# Patient Record
Sex: FEMALE | Race: BLACK OR AFRICAN AMERICAN | NOT HISPANIC OR LATINO | Employment: OTHER | URBAN - METROPOLITAN AREA
[De-identification: names, ages, dates, MRNs, and addresses within clinical notes are randomized per-mention and may not be internally consistent; named-entity substitution may affect disease eponyms.]

---

## 2022-09-18 ENCOUNTER — HOSPITAL ENCOUNTER (INPATIENT)
Facility: HOSPITAL | Age: 87
LOS: 5 days | Discharge: HOSPICE | DRG: 871 | End: 2022-09-23
Attending: STUDENT IN AN ORGANIZED HEALTH CARE EDUCATION/TRAINING PROGRAM | Admitting: INTERNAL MEDICINE
Payer: MEDICARE

## 2022-09-18 DIAGNOSIS — A41.9 SEPSIS, DUE TO UNSPECIFIED ORGANISM, UNSPECIFIED WHETHER ACUTE ORGAN DYSFUNCTION PRESENT: ICD-10-CM

## 2022-09-18 DIAGNOSIS — I48.91 ATRIAL FIBRILLATION: ICD-10-CM

## 2022-09-18 DIAGNOSIS — A41.9 SEPSIS: ICD-10-CM

## 2022-09-18 DIAGNOSIS — I47.20 V-TACH: ICD-10-CM

## 2022-09-18 DIAGNOSIS — S09.8XXA BLUNT HEAD TRAUMA: ICD-10-CM

## 2022-09-18 DIAGNOSIS — J18.9 PNEUMONIA OF LEFT LOWER LOBE DUE TO INFECTIOUS ORGANISM: Primary | ICD-10-CM

## 2022-09-18 DIAGNOSIS — R07.9 CHEST PAIN: ICD-10-CM

## 2022-09-18 DIAGNOSIS — R09.02 HYPOXIA: ICD-10-CM

## 2022-09-18 LAB
ABS NEUT (OLG): 3.55 X10(3)/MCL (ref 2.1–9.2)
ALBUMIN SERPL-MCNC: 2.8 GM/DL (ref 3.4–4.8)
ALBUMIN/GLOB SERPL: 1.1 RATIO (ref 1.1–2)
ALP SERPL-CCNC: 52 UNIT/L (ref 40–150)
ALT SERPL-CCNC: 12 UNIT/L (ref 0–55)
APTT PPP: 32.2 SECONDS (ref 23.2–33.7)
AST SERPL-CCNC: 24 UNIT/L (ref 5–34)
BILIRUBIN DIRECT+TOT PNL SERPL-MCNC: 0.7 MG/DL
BUN SERPL-MCNC: 26.3 MG/DL (ref 9.8–20.1)
CALCIUM SERPL-MCNC: 9 MG/DL (ref 8.4–10.2)
CHLORIDE SERPL-SCNC: 108 MMOL/L (ref 98–107)
CO2 SERPL-SCNC: 18 MMOL/L (ref 23–31)
CREAT SERPL-MCNC: 1.78 MG/DL (ref 0.55–1.02)
ERYTHROCYTE [DISTWIDTH] IN BLOOD BY AUTOMATED COUNT: 13.7 % (ref 11.5–17)
ETHANOL SERPL-MCNC: <10 MG/DL
FLUAV AG UPPER RESP QL IA.RAPID: NOT DETECTED
FLUAV AG UPPER RESP QL IA.RAPID: NOT DETECTED
FLUBV AG UPPER RESP QL IA.RAPID: NOT DETECTED
FLUBV AG UPPER RESP QL IA.RAPID: NOT DETECTED
GFR SERPLBLD CREATININE-BSD FMLA CKD-EPI: 28 MLS/MIN/1.73/M2
GLOBULIN SER-MCNC: 2.6 GM/DL (ref 2.4–3.5)
GLUCOSE SERPL-MCNC: 189 MG/DL (ref 70–110)
GLUCOSE SERPL-MCNC: 246 MG/DL (ref 82–115)
HCT VFR BLD AUTO: 40.3 % (ref 37–47)
HGB BLD-MCNC: 11.7 GM/DL (ref 12–16)
IMM GRANULOCYTES # BLD AUTO: 0.02 X10(3)/MCL (ref 0–0.04)
IMM GRANULOCYTES NFR BLD AUTO: 0.4 %
INR BLD: 1.41 (ref 0–1.3)
INSTRUMENT WBC (OLG): 5.3 X10(3)/MCL
LACTATE SERPL-SCNC: 4.2 MMOL/L (ref 0.5–2.2)
LACTATE SERPL-SCNC: 5.4 MMOL/L (ref 0.5–2.2)
LACTATE SERPL-SCNC: 6.9 MMOL/L (ref 0.5–2.2)
LYMPHOCYTES NFR BLD MANUAL: 0.95 X10(3)/MCL
LYMPHOCYTES NFR BLD MANUAL: 18 %
MCH RBC QN AUTO: 29.3 PG (ref 27–31)
MCHC RBC AUTO-ENTMCNC: 29 MG/DL (ref 33–36)
MCV RBC AUTO: 100.8 FL (ref 80–94)
METAMYELOCYTES NFR BLD MANUAL: 10 %
MONOCYTES NFR BLD MANUAL: 0.74 X10(3)/MCL (ref 0.1–1.3)
MONOCYTES NFR BLD MANUAL: 14 %
MYELOCYTES NFR BLD MANUAL: 4 %
NEUTROPHILS NFR BLD MANUAL: 53 %
NRBC BLD AUTO-RTO: 0 %
PLATELET # BLD AUTO: 154 X10(3)/MCL (ref 130–400)
PLATELET # BLD EST: NORMAL 10*3/UL
PMV BLD AUTO: 10.1 FL (ref 7.4–10.4)
POCT GLUCOSE: 189 MG/DL (ref 70–110)
POTASSIUM SERPL-SCNC: 5.4 MMOL/L (ref 3.5–5.1)
PROT SERPL-MCNC: 5.4 GM/DL (ref 5.8–7.6)
PROTHROMBIN TIME: 17 SECONDS (ref 12.5–14.5)
RBC # BLD AUTO: 4 X10(6)/MCL (ref 4.2–5.4)
SARS-COV-2 RNA RESP QL NAA+PROBE: NOT DETECTED
SARS-COV-2 RNA RESP QL NAA+PROBE: NOT DETECTED
SODIUM SERPL-SCNC: 140 MMOL/L (ref 136–145)
WBC # SPEC AUTO: 5.3 X10(3)/MCL (ref 4.5–11.5)

## 2022-09-18 PROCEDURE — 96365 THER/PROPH/DIAG IV INF INIT: CPT

## 2022-09-18 PROCEDURE — 93010 EKG 12-LEAD: ICD-10-PCS | Mod: ,,, | Performed by: INTERNAL MEDICINE

## 2022-09-18 PROCEDURE — 87636 SARSCOV2 & INF A&B AMP PRB: CPT

## 2022-09-18 PROCEDURE — 27000221 HC OXYGEN, UP TO 24 HOURS

## 2022-09-18 PROCEDURE — 93005 ELECTROCARDIOGRAM TRACING: CPT

## 2022-09-18 PROCEDURE — G0390 TRAUMA RESPONS W/HOSP CRITI: HCPCS

## 2022-09-18 PROCEDURE — 93010 ELECTROCARDIOGRAM REPORT: CPT | Mod: ,,, | Performed by: INTERNAL MEDICINE

## 2022-09-18 PROCEDURE — 86901 BLOOD TYPING SEROLOGIC RH(D): CPT | Performed by: SURGERY

## 2022-09-18 PROCEDURE — 96374 THER/PROPH/DIAG INJ IV PUSH: CPT | Mod: 59

## 2022-09-18 PROCEDURE — 83605 ASSAY OF LACTIC ACID: CPT | Performed by: INTERNAL MEDICINE

## 2022-09-18 PROCEDURE — 25500020 PHARM REV CODE 255: Performed by: STUDENT IN AN ORGANIZED HEALTH CARE EDUCATION/TRAINING PROGRAM

## 2022-09-18 PROCEDURE — 63600175 PHARM REV CODE 636 W HCPCS: Performed by: PHYSICIAN ASSISTANT

## 2022-09-18 PROCEDURE — 25000242 PHARM REV CODE 250 ALT 637 W/ HCPCS: Performed by: INTERNAL MEDICINE

## 2022-09-18 PROCEDURE — 85025 COMPLETE CBC W/AUTO DIFF WBC: CPT | Performed by: SURGERY

## 2022-09-18 PROCEDURE — 63600175 PHARM REV CODE 636 W HCPCS: Performed by: STUDENT IN AN ORGANIZED HEALTH CARE EDUCATION/TRAINING PROGRAM

## 2022-09-18 PROCEDURE — 25000003 PHARM REV CODE 250: Performed by: PHYSICIAN ASSISTANT

## 2022-09-18 PROCEDURE — 82077 ASSAY SPEC XCP UR&BREATH IA: CPT | Performed by: SURGERY

## 2022-09-18 PROCEDURE — 80053 COMPREHEN METABOLIC PANEL: CPT | Performed by: SURGERY

## 2022-09-18 PROCEDURE — 83605 ASSAY OF LACTIC ACID: CPT | Performed by: SURGERY

## 2022-09-18 PROCEDURE — 87636 SARSCOV2 & INF A&B AMP PRB: CPT | Performed by: STUDENT IN AN ORGANIZED HEALTH CARE EDUCATION/TRAINING PROGRAM

## 2022-09-18 PROCEDURE — 96366 THER/PROPH/DIAG IV INF ADDON: CPT

## 2022-09-18 PROCEDURE — 99285 EMERGENCY DEPT VISIT HI MDM: CPT | Mod: 25

## 2022-09-18 PROCEDURE — 85610 PROTHROMBIN TIME: CPT | Performed by: SURGERY

## 2022-09-18 PROCEDURE — 96361 HYDRATE IV INFUSION ADD-ON: CPT

## 2022-09-18 PROCEDURE — 25000003 PHARM REV CODE 250: Performed by: STUDENT IN AN ORGANIZED HEALTH CARE EDUCATION/TRAINING PROGRAM

## 2022-09-18 PROCEDURE — 36415 COLL VENOUS BLD VENIPUNCTURE: CPT | Performed by: SURGERY

## 2022-09-18 PROCEDURE — 11000001 HC ACUTE MED/SURG PRIVATE ROOM

## 2022-09-18 PROCEDURE — 85730 THROMBOPLASTIN TIME PARTIAL: CPT | Performed by: SURGERY

## 2022-09-18 PROCEDURE — 87040 BLOOD CULTURE FOR BACTERIA: CPT | Performed by: STUDENT IN AN ORGANIZED HEALTH CARE EDUCATION/TRAINING PROGRAM

## 2022-09-18 PROCEDURE — 94640 AIRWAY INHALATION TREATMENT: CPT

## 2022-09-18 RX ORDER — METHOCARBAMOL 750 MG/1
750 TABLET, FILM COATED ORAL 3 TIMES DAILY
Status: DISCONTINUED | OUTPATIENT
Start: 2022-09-18 | End: 2022-09-18

## 2022-09-18 RX ORDER — LANSOPRAZOLE 30 MG/1
30 CAPSULE, DELAYED RELEASE ORAL DAILY
COMMUNITY
Start: 2022-09-02

## 2022-09-18 RX ORDER — TALC
6 POWDER (GRAM) TOPICAL NIGHTLY PRN
Status: DISCONTINUED | OUTPATIENT
Start: 2022-09-18 | End: 2022-09-23 | Stop reason: HOSPADM

## 2022-09-18 RX ORDER — OXYCODONE HYDROCHLORIDE 5 MG/1
5 TABLET ORAL EVERY 4 HOURS PRN
Status: DISCONTINUED | OUTPATIENT
Start: 2022-09-18 | End: 2022-09-23 | Stop reason: HOSPADM

## 2022-09-18 RX ORDER — CALCIUM CARBONATE 200(500)MG
1000 TABLET,CHEWABLE ORAL 3 TIMES DAILY PRN
Status: DISCONTINUED | OUTPATIENT
Start: 2022-09-18 | End: 2022-09-23 | Stop reason: HOSPADM

## 2022-09-18 RX ORDER — SODIUM CHLORIDE 9 MG/ML
INJECTION, SOLUTION INTRAVENOUS CONTINUOUS
Status: DISCONTINUED | OUTPATIENT
Start: 2022-09-18 | End: 2022-09-21

## 2022-09-18 RX ORDER — MIRTAZAPINE 15 MG/1
7.5 TABLET, FILM COATED ORAL DAILY
COMMUNITY
Start: 2022-06-21

## 2022-09-18 RX ORDER — INSULIN ASPART 100 [IU]/ML
1-10 INJECTION, SOLUTION INTRAVENOUS; SUBCUTANEOUS
Status: DISCONTINUED | OUTPATIENT
Start: 2022-09-18 | End: 2022-09-23 | Stop reason: HOSPADM

## 2022-09-18 RX ORDER — FUROSEMIDE 20 MG/1
TABLET ORAL
COMMUNITY
Start: 2022-07-19

## 2022-09-18 RX ORDER — MEMANTINE HYDROCHLORIDE 10 MG/1
10 TABLET ORAL 2 TIMES DAILY
COMMUNITY
Start: 2022-09-02

## 2022-09-18 RX ORDER — IBUPROFEN 200 MG
24 TABLET ORAL
Status: DISCONTINUED | OUTPATIENT
Start: 2022-09-18 | End: 2022-09-23 | Stop reason: HOSPADM

## 2022-09-18 RX ORDER — SODIUM CHLORIDE, SODIUM LACTATE, POTASSIUM CHLORIDE, CALCIUM CHLORIDE 600; 310; 30; 20 MG/100ML; MG/100ML; MG/100ML; MG/100ML
INJECTION, SOLUTION INTRAVENOUS CONTINUOUS
Status: DISCONTINUED | OUTPATIENT
Start: 2022-09-18 | End: 2022-09-18

## 2022-09-18 RX ORDER — ACETAMINOPHEN 325 MG/1
650 TABLET ORAL EVERY 4 HOURS
Status: DISCONTINUED | OUTPATIENT
Start: 2022-09-18 | End: 2022-09-23 | Stop reason: HOSPADM

## 2022-09-18 RX ORDER — IPRATROPIUM BROMIDE AND ALBUTEROL SULFATE 2.5; .5 MG/3ML; MG/3ML
3 SOLUTION RESPIRATORY (INHALATION)
Status: COMPLETED | OUTPATIENT
Start: 2022-09-18 | End: 2022-09-18

## 2022-09-18 RX ORDER — IBUPROFEN 200 MG
16 TABLET ORAL
Status: DISCONTINUED | OUTPATIENT
Start: 2022-09-18 | End: 2022-09-23 | Stop reason: HOSPADM

## 2022-09-18 RX ORDER — GLUCAGON 1 MG
1 KIT INJECTION
Status: DISCONTINUED | OUTPATIENT
Start: 2022-09-18 | End: 2022-09-23 | Stop reason: HOSPADM

## 2022-09-18 RX ORDER — DEXTROSE MONOHYDRATE 100 MG/ML
12.5 INJECTION, SOLUTION INTRAVENOUS
Status: DISCONTINUED | OUTPATIENT
Start: 2022-09-18 | End: 2022-09-23 | Stop reason: HOSPADM

## 2022-09-18 RX ORDER — ONDANSETRON 2 MG/ML
4 INJECTION INTRAMUSCULAR; INTRAVENOUS EVERY 6 HOURS PRN
Status: DISCONTINUED | OUTPATIENT
Start: 2022-09-18 | End: 2022-09-23 | Stop reason: HOSPADM

## 2022-09-18 RX ORDER — AMLODIPINE AND OLMESARTAN MEDOXOMIL 5; 20 MG/1; MG/1
1 TABLET ORAL DAILY
Status: ON HOLD | COMMUNITY
Start: 2022-09-12 | End: 2022-09-23 | Stop reason: HOSPADM

## 2022-09-18 RX ORDER — DONEPEZIL HYDROCHLORIDE 10 MG/1
10 TABLET, FILM COATED ORAL NIGHTLY
COMMUNITY
Start: 2022-08-08

## 2022-09-18 RX ORDER — CLONIDINE HYDROCHLORIDE 0.1 MG/1
0.1 TABLET ORAL 2 TIMES DAILY
COMMUNITY
Start: 2022-09-02

## 2022-09-18 RX ORDER — DOCUSATE SODIUM 100 MG/1
100 CAPSULE, LIQUID FILLED ORAL 2 TIMES DAILY
Status: DISCONTINUED | OUTPATIENT
Start: 2022-09-18 | End: 2022-09-23 | Stop reason: HOSPADM

## 2022-09-18 RX ORDER — OXYCODONE HYDROCHLORIDE 5 MG/1
10 TABLET ORAL EVERY 4 HOURS PRN
Status: DISCONTINUED | OUTPATIENT
Start: 2022-09-18 | End: 2022-09-23 | Stop reason: HOSPADM

## 2022-09-18 RX ORDER — ADHESIVE BANDAGE
30 BANDAGE TOPICAL DAILY PRN
Status: DISCONTINUED | OUTPATIENT
Start: 2022-09-18 | End: 2022-09-23 | Stop reason: HOSPADM

## 2022-09-18 RX ORDER — ACETAMINOPHEN 10 MG/ML
1000 INJECTION, SOLUTION INTRAVENOUS ONCE
Status: COMPLETED | OUTPATIENT
Start: 2022-09-18 | End: 2022-09-18

## 2022-09-18 RX ORDER — POTASSIUM CHLORIDE 20 MEQ/1
20 TABLET, EXTENDED RELEASE ORAL DAILY
Status: ON HOLD | COMMUNITY
Start: 2022-09-02 | End: 2022-09-23 | Stop reason: HOSPADM

## 2022-09-18 RX ORDER — CHLOPHEDIANOL HCL AND PYRILAMINE MALEATE 12.5; 12.5 MG/5ML; MG/5ML
SOLUTION ORAL
COMMUNITY
Start: 2022-05-27

## 2022-09-18 RX ORDER — LEVOTHYROXINE SODIUM 75 UG/1
75 TABLET ORAL DAILY
COMMUNITY
Start: 2022-09-02

## 2022-09-18 RX ORDER — ENOXAPARIN SODIUM 100 MG/ML
30 INJECTION SUBCUTANEOUS EVERY 24 HOURS
Status: DISCONTINUED | OUTPATIENT
Start: 2022-09-18 | End: 2022-09-23

## 2022-09-18 RX ORDER — GABAPENTIN 300 MG/1
300 CAPSULE ORAL 3 TIMES DAILY
Status: DISCONTINUED | OUTPATIENT
Start: 2022-09-18 | End: 2022-09-18

## 2022-09-18 RX ORDER — MONTELUKAST SODIUM 10 MG/1
10 TABLET ORAL NIGHTLY
COMMUNITY
Start: 2022-09-06

## 2022-09-18 RX ORDER — MIRTAZAPINE 7.5 MG/1
7.5 TABLET, FILM COATED ORAL NIGHTLY
Status: DISCONTINUED | OUTPATIENT
Start: 2022-09-18 | End: 2022-09-23 | Stop reason: HOSPADM

## 2022-09-18 RX ORDER — ACETAMINOPHEN 325 MG/1
650 TABLET ORAL EVERY 4 HOURS PRN
Status: DISCONTINUED | OUTPATIENT
Start: 2022-09-18 | End: 2022-09-23 | Stop reason: HOSPADM

## 2022-09-18 RX ORDER — POLYETHYLENE GLYCOL 3350 17 G/17G
17 POWDER, FOR SOLUTION ORAL 2 TIMES DAILY
Status: CANCELLED | OUTPATIENT
Start: 2022-09-18

## 2022-09-18 RX ADMIN — IOPAMIDOL 100 ML: 755 INJECTION, SOLUTION INTRAVENOUS at 12:09

## 2022-09-18 RX ADMIN — IPRATROPIUM BROMIDE AND ALBUTEROL SULFATE 3 ML: 2.5; .5 SOLUTION RESPIRATORY (INHALATION) at 11:09

## 2022-09-18 RX ADMIN — SODIUM ZIRCONIUM CYCLOSILICATE 10 G: 10 POWDER, FOR SUSPENSION ORAL at 03:09

## 2022-09-18 RX ADMIN — SODIUM CHLORIDE: 9 INJECTION, SOLUTION INTRAVENOUS at 03:09

## 2022-09-18 RX ADMIN — PIPERACILLIN AND TAZOBACTAM 4.5 G: 4; .5 INJECTION, POWDER, LYOPHILIZED, FOR SOLUTION INTRAVENOUS; PARENTERAL at 03:09

## 2022-09-18 RX ADMIN — INSULIN ASPART 1 UNITS: 100 INJECTION, SOLUTION INTRAVENOUS; SUBCUTANEOUS at 07:09

## 2022-09-18 RX ADMIN — SODIUM CHLORIDE, POTASSIUM CHLORIDE, SODIUM LACTATE AND CALCIUM CHLORIDE 1000 ML: 600; 310; 30; 20 INJECTION, SOLUTION INTRAVENOUS at 12:09

## 2022-09-18 RX ADMIN — ACETAMINOPHEN 1000 MG: 10 INJECTION, SOLUTION INTRAVENOUS at 12:09

## 2022-09-18 RX ADMIN — SODIUM CHLORIDE, POTASSIUM CHLORIDE, SODIUM LACTATE AND CALCIUM CHLORIDE 1000 ML: 600; 310; 30; 20 INJECTION, SOLUTION INTRAVENOUS at 01:09

## 2022-09-18 RX ADMIN — ENOXAPARIN SODIUM 30 MG: 30 INJECTION SUBCUTANEOUS at 05:09

## 2022-09-18 NOTE — Clinical Note
Diagnosis: Pneumonia of left lower lobe due to infectious organism [[id number]]   Admitting Provider:: ADAM SY [[id number]]   Future Attending Provider: ADAM SY [[id number]]   Reason for IP Medical Treatment  (Clinical interventions that can only be accomplished in the IP setting? ) :: IV abx   Estimated Length of Stay:: 2 midnights   I certify that Inpatient services for greater than or equal to 2 midnights are medically necessary:: Yes   Plans for Post-Acute care--if anticipated (pick the single best option):: A. No post acute care anticipated at this time

## 2022-09-18 NOTE — H&P
[facility] Medicine History & Physical Examination       Patient Name: Mena Conti  MRN: [id number]  Patient Class: Emergency   Admission Date: 9/18/2022   Admitting Physician: Karl Rushing MD  Length of Stay: 0  Attending Physician: Karl Rushing MD  Primary Care Provider: Unknown  Face-to-Face encounter date: 09/18/2022  Code Status: Full Code  Chief Complaint: No chief complaint on file.        Patient information was obtained from patient, patient's family, past medical records and ER records.     HISTORY OF PRESENT ILLNESS:   Mena Conti is a 87 y.o. Black or  female with a past medical history of hypertension, asthma, glaucoma, dementia, diabetes mellitus type 2 and valvular heart disease. The patient presented to [facility] on 9/18/2022 following a fall. Granddaughter who was at bedside states patient told her daughter who she lives with that she was going to the bathroom. When daughter went to check on her she was found on the ground awake and alert. Patient denies head injury or loss of consciousness.  Patient has been having a productive cough for the last week.  She presented to her PCP earlier in the week due to blood pressure being elevated at home with a systolic in the 190s and Daniel was prescribed.  She began taking Daniel are approximately 3 days ago.  Patient denies complaints of chest pain, fever, chills, sweats, shortness of breath.  She lives with daughter, ambulates with walker and needs assistance with activities of daily living.    Upon presentation to the ED, patient febrile with a temperature 100.9° F, blood pressure 105/58 and SpO2 96% on room air.  Patient's blood pressure decreased to 8453 which responded to IV fluid hydration.  She is placed on nasal cannula which has since been titrated up to 4 L. labs notable for WBC 5.3, hemoglobin 11.7, hematocrit 40.3, .8, potassium 5.4, CO2 18, BUN/creatinine 26.3/1.78, glucose 246,  lactic acid 5.4.  No prior labs for comparison.  Ethanol level less than 10.  Influenza A and B and SARS-CoV-2 PCR negative.  Chest x-ray with moderate left pleural effusion with basilar airspace opacities.  X-ray of the pelvis without fracture.  CT head without acute intracranial abnormalities.  CT of the cervical spine with asymmetry in the interval between the dens and lateral masses of the C1 appears position with unremarkable alignment of the lateral masses of C1 and C2 and small old avulsed fragment from the right lateral mass of C1.  CT chest, abdomen and pelvis with left basilar consolidative and ground-glass airspace opacity concerning for infection, heterogeneous enhancement of the spleen likely perfusional an large left hiatal hernia.    PAST MEDICAL HISTORY:   Dementia   Glaucoma   Hypertension  Diabetes mellitus type 2   Asthma  Valvular heart disease    PAST SURGICAL HISTORY:   Hysterectomy   Colonoscopy    ALLERGIES:   Patient has no allergy information on record.    FAMILY HISTORY:   Mother: Myocardial infarction    SOCIAL HISTORY:   Denies tobacco, alcohol and illicit drug use    HOME MEDICATIONS:   As documented    REVIEW OF SYSTEMS:   Except as documented, all other systems reviewed and negative     PHYSICAL EXAM:     VITAL SIGNS: 24 HRS MIN & MAX LAST   Temp  Min: 99.9 °F (37.7 °C)  Max: 100.9 °F (38.3 °C) 99.9 °F (37.7 °C)   BP  Min: 84/53  Max: 106/63 100/63   Pulse  Min: 77  Max: 93  77   Resp  Min: 19  Max: 25 19   SpO2  Min: 93 %  Max: 96 % (!) 93 %         General appearance: Well-developed, well-nourished female in no apparent distress.  Granddaughter at bedside.  HEENT: Atraumatic head. Moist mucous membranes of oral cavity.  Lungs:  Course and diminished on left.  On 4 L O2 via nasal cannula.  Heart: Regular rate and rhythm.   Abdomen: Soft, non-distended.   Extremities: No cyanosis, clubbing. No deformities.  Skin: No Rash. Warm and dry.  Neuro: Awake, alert and oriented. Motor and  sensory exams grossly intact.  Psych/mental status: Appropriate mood and affect. Cooperative. Responds appropriately to questions.       LABS AND IMAGING:     Recent Labs   Lab 09/18/22  1211   WBC 5.3   RBC 4.00*   HGB 11.7*   HCT 40.3   .8*   MCH 29.3   MCHC 29.0*   RDW 13.7      MPV 10.1       Recent Labs   Lab 09/18/22  1211      K 5.4*   CO2 18*   BUN 26.3*   CREATININE 1.78*   CALCIUM 9.0   ALBUMIN 2.8*   ALKPHOS 52   ALT 12   AST 24   BILITOT 0.7       Microbiology Results (last 7 days)       Procedure Component Value Units Date/Time    Blood Culture #1 **CANNOT BE ORDERED STAT** [[id number]]     Order Status: Sent Specimen: Blood     Blood culture #2 **CANNOT BE ORDERED STAT** [[id number]]     Order Status: Sent Specimen: Blood              CT Chest Abdomen Pelvis With Contrast (xpd)  Narrative: EXAMINATION:  CT CHEST ABDOMEN PELVIS WITH CONTRAST (XPD)    CLINICAL HISTORY:  Polytrauma, blunt;    TECHNIQUE:  CT of the chest, abdomen and pelvis WITH intravenous contrast. The chest, abdomen and pelvis were scanned utilizing a multidetector helical scanner from the lung apex to the lesser trochanter after the administration of intravenous contrast.    Coronal and sagittal reconstructions were obtained from the axial data set.    Automatic exposure control was utilized to limit radiation dose.    Radiation Dose:    Total DLP: 609 mGy*cm    COMPARISON:  None    FINDINGS:  LINES/TUBES: None.    AIRWAYS: Clear centrally.    LUNGS: There are consolidative and ground-glass airspace opacities in the left lung base.    PLEURA: No pleural effusions. No pneumothoraces.    HEART AND MEDIASTINUM: The heart is normal in size.  There is no pericardial effusion.  There is no evidence of a thoracic aortic injury.    SOFT TISSUES: Normal.    THORACIC BONES: No acute bony pathology.    ABDOMEN/PELVIS:    Evaluation is limited by motion artifact.    HEPATOBILIARY: The liver is slightly nodular in contour.  No  evidence of acute injury.    SPLEEN: Heterogeneous enhancement in the spleen may be perfusional.    PANCREAS: Unremarkable.    ADRENALS: 1.5 cm right adrenal myelolipoma.    KIDNEYS/URETERS: No evidence of acute injury.    PELVIC ORGANS/BLADDER: The uterus has been surgically resected.  The bladder is unremarkable.    PERITONEUM/RETROPERITONEUM: No free intraperitoneal air. No free fluid.    LYMPH NODES: No lymphadenopathy.    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    GI TRACT: No bowel obstruction.  Large hiatal hernia with nearly all the stomach in the left hemithorax.    ABDOMINOPELVIC BONES AND SOFT TISSUE: Soft tissues within normal limits. No acute bony pathology.  Severe degenerative changes of the right hip.  Impression: 1. Left basilar consolidative and ground-glass airspace opacities, concerning for infection.  Recommend follow-up after treatment to ensure resolution.  2. Heterogeneous enhancement of the spleen is likely perfusional.  3. Large left hiatal hernia.    Electronically signed by: Ragini Zamorano  Date:    09/18/2022  Time:    13:05  CT Cervical Spine Without Contrast  Narrative: EXAMINATION:  CT CERVICAL SPINE WITHOUT CONTRAST    CLINICAL HISTORY:  Trauma.    TECHNIQUE:  Multidetector axial images were performed of the cervical spine without and.  Images were reconstructed.    Automated exposure control was utilized to minimize radiation dose.  DLP 1960.    COMPARISON:  None available.    FINDINGS:  There is decrease interval on the right between the dens and the right lateral mass of C1 which may be positional without loss of alignment of the lateral masses of C1 and C2.  There is small corticated ossification adjacent to the right lateral mass of C1 on image 20 series 9 which is not convinced to be acute.   No acute appearing fracture or malalignment identified.  There is grade 1 anterolisthesis of C4 on C5.  Cervical vertebrae is stature  preserved.  There are multilevel degenerative changes which cause ventral impression upon the thecal sac and narrowings of the neural foramen.  There is no prevertebral soft tissue prominence.    This study does not exclude the possibility of intrathecal soft tissue, ligamentous or vascular injury.  Impression: 1.  Asymmetry in the interval between dens and lateral masses of C1 appears positional with unremarkable alignment of lateral masses of C1 and C2.    2.  Small old avulsed fragment from right lateral mass of C1.    No definite acute fracture or malalignment identified    Electronically signed by: Amando Reyes  Date:    09/18/2022  Time:    12:55  CT Head Without Contrast  Narrative: EXAMINATION:  CT HEAD WITHOUT CONTRAST    CLINICAL HISTORY:  Trauma;    TECHNIQUE:  Sequential axial images were performed of the brain without contrast.    Dose product length of 1960 mGycm. Automated exposure control was utilized to minimize radiation dose.    COMPARISON:  None available.    FINDINGS:  There is no intracranial mass effect, midline shift, hydrocephalus or hemorrhage. There is no sulcal effacement or low attenuation changes to suggest recent large vessel territory infarction.  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  Chronic appearing periventricular and subcortical white matter low attenuation changes are present and are consistent with chronic microangiopathic ischemia. The ventricular system and sulcal markings prominence is consistent with atrophy. There is no acute extra axial fluid collection.  There is no acute depressed skull fracture.  Visualized paranasal sinuses are clear without mucosal thickening, polypoidal abnormality or air-fluid levels. Mastoid air cells aeration is optimal.  Impression: No acute intracranial findings identified.    Electronically signed by: Amando Reyes  Date:    09/18/2022  Time:    12:47  X-Ray Pelvis Routine AP  Narrative: EXAMINATION:  XR PELVIS ROUTINE AP    CLINICAL HISTORY:  r/o  bleeding or hemorrhage;    COMPARISON:  None.    FINDINGS:  Evaluation is limited by overlying external line.  There is no definite displaced fracture identified.  There are severe degenerative changes of the hips, right greater left, with joint space loss and osteophyte formation.  The soft tissues are unremarkable.  Impression: No definite displaced fracture identified.    Electronically signed by: Ragini Zamorano  Date:    09/18/2022  Time:    12:42  X-Ray Chest 1 View  Narrative: EXAMINATION:  XR CHEST 1 VIEW    CLINICAL HISTORY:  r/o bleeding or hemorrhage;    TECHNIQUE:  AP chest    COMPARISON:  None.    FINDINGS:  The heart is enlarged.  There is a moderate left pleural effusion with overlying basilar airspace opacities.  The right lung is clear.  There is no definite visible pneumothorax.  There is no definite displaced fracture identified.  Impression: Moderate left pleural effusion with basilar airspace opacities.    Electronically signed by: Ragini Zamorano  Date:    09/18/2022  Time:    12:40        ASSESSMENT & PLAN:   Assessment:  Community-acquired pneumonia - left  Left pleural effusion  Macrocytic anemia  TRELL versus CKD stage 3  Hyperkalemia  Lactic acidosis  Essential hypertension  Diabetes mellitus type 2 with hyperglycemia  Dementia    Plan:  - Discontinue Vancomycin and azithromycin.  Continue with Zosyn  - Follow results of blood cultures adjust antibiotics as indicated  - IV fluid hydration  - Trend lactic acid  - Lokelma ordered for hyperkalemia.  Continue to monitor electrolytes  - Accu-Cheks and sliding scale  - Labs in AM      VTE Prophylaxis: will be placed on Lovenox for DVT prophylaxis and will be advised to be as mobile as possible and sit in a chair as tolerated      __________________________________________________________________________  INPATIENT LIST OF MEDICATIONS     Current Facility-Administered Medications:     0.9%  NaCl infusion, , Intravenous, Continuous, Patt HENDRIX  MIRANDA Lauren    acetaminophen tablet 650 mg, 650 mg, Oral, Q4H, Good Johnston MD    acetaminophen tablet 650 mg, 650 mg, Oral, Q4H PRN, Patt Lauren PA-C    azithromycin 500 mg in dextrose 5 % 250 mL IVPB (ready to mix system), 500 mg, Intravenous, ED 1 Time, Jose Luis LYONS Curet MD NA    calcium carbonate 200 mg calcium (500 mg) chewable tablet 1,000 mg, 1,000 mg, Oral, TID PRN, Good Johnston MD    docusate sodium capsule 100 mg, 100 mg, Oral, BID, Good Johnston MD    enoxaparin injection 40 mg, 40 mg, Subcutaneous, Daily, Patt Lauren PA-C    gabapentin capsule 300 mg, 300 mg, Oral, TID, Good Johnston MD    lactated ringers bolus 1,000 mL, 1,000 mL, Intravenous, ED 1 Time, Jose Luis B Curet IV, MD    lactated ringers infusion, , Intravenous, Continuous, Good Johnston MD    magnesium hydroxide 400 mg/5 ml suspension 2,400 mg, 30 mL, Oral, Daily PRN, Good Johnston MD    melatonin tablet 6 mg, 6 mg, Oral, Nightly PRN, Good Johnston MD    methocarbamoL tablet 750 mg, 750 mg, Oral, TID, Good Johnston MD    ondansetron injection 4 mg, 4 mg, Intravenous, Q6H PRN, Patt Lauren PA-C    oxyCODONE immediate release tablet 10 mg, 10 mg, Oral, Q4H PRN, Good Johnston MD    oxyCODONE immediate release tablet 5 mg, 5 mg, Oral, Q4H PRN, Good Johnston MD    piperacillin-tazobactam (ZOSYN) 4.5 g in dextrose 5 % in water (D5W) 5 % 100 mL IVPB (MB+), 4.5 g, Intravenous, Q8H, Jose Luis B Curet IV, MD    sodium zirconium cyclosilicate packet 10 g, 10 g, Oral, TID, Patt Lauren PA-C    Pharmacy to dose Vancomycin consult, , , Once **AND** vancomycin - pharmacy to dose, , Intravenous, pharmacy to manage frequency, Jose Luis Muñoz IV, MD    vancomycin 1.5 g in dextrose 5 % 250 mL IVPB (ready to mix), 1,500 mg, Intravenous, Once, Jose Luis Muñoz IV, MD  No current outpatient medications on file.      Scheduled Meds:   acetaminophen  650 mg Oral Q4H    azithromycin  500 mg  Intravenous ED 1 Time    docusate sodium  100 mg Oral BID    enoxaparin  40 mg Subcutaneous Daily    gabapentin  300 mg Oral TID    lactated ringers  1,000 mL Intravenous ED 1 Time    methocarbamoL  750 mg Oral TID    piperacillin-tazobactam (ZOSYN) IVPB  4.5 g Intravenous Q8H    sodium zirconium cyclosilicate  10 g Oral TID    vancomycin (VANCOCIN) IVPB  1,500 mg Intravenous Once     Continuous Infusions:   sodium chloride 0.9%      lactated ringers       PRN Meds:.acetaminophen, calcium carbonate, magnesium hydroxide 400 mg/5 ml, melatonin, ondansetron, oxyCODONE, oxyCODONE, Pharmacy to dose Vancomycin consult **AND** vancomycin - pharmacy to dose      Discharge Planning and Disposition: Anticipated discharge to be determined.    IPatt PA, have reviewed and discussed the case with Dr. Karl Rushing.    Please see the following addendum for further assessment and plan from there attending MD.    Patt Lauren PA-C  09/18/2022    _________   Patient

## 2022-09-18 NOTE — H&P
Trauma Surgery  Activation Note/Admission H&P    HPI: 86 yo F with a Hx of DM2, COPD and valvular dysfunction (unspecified), arrives as a level 1 trauma activation s/p unwitnessed GLF where she hit her head with brief LOC on a piece of furniture. GCS was 5 on EMS. Mentation improved during transport. On arrival to the trauma bay, she was protecting her airway, had NWOB on RA, was hypotensive and GCS14. She received a bolus of LR with good response.    Primary Survey:  A: Airway intact, protected  B: Non-labored breathing, BS B/L  C: HDS, distal pulses intact  D: GCS 14  E: Exposed, log-rolled, and examined (see below)    PMH: DM2, COPD, valvular dysfunction (unspecified)  PSH: None known  Meds: None known  Allergies: None known    Secondary Survey:  Gen: NAD  Neuro: GCS 14; PERRL  HEENT: NCAT; c-collar in place  CV: RRR; 2+ radial and DP pulses  Resp: Non-labored breathing, CTAB  Abd: S, ND, NT  Rectal: Normal tone, no gross blood  : Normal external genitalia; no blood at urethral meatus  Back/Spine: No bony TTP, no palpable step-offs or deformities  Ext: Moves all 4 spontaneously and purposefully, no gross deformities  Skin: Warm, well perfused; no abrasions, lacerations, or ecchymoses    Labs:  Labs Reviewed   CBC W/ AUTO DIFFERENTIAL    Narrative:     The following orders were created for panel order CBC auto differential.  Procedure                               Abnormality         Status                     ---------                               -----------         ------                     CBC with Differential[[id number]]                                                         Please view results for these tests on the individual orders.   COMPREHENSIVE METABOLIC PANEL   PROTIME-INR   APTT   LACTIC ACID, PLASMA   URINALYSIS, REFLEX TO URINE CULTURE   DRUG SCREEN, URINE (BEAKER)   ALCOHOL,MEDICAL (ETHANOL)   CBC WITH DIFFERENTIAL   TYPE & SCREEN     Imaging:  Imaging Results    None        Assessment/Plan:  88 yo F s/p GLF where she hit her head with brief LOC. No acute injuries identified.     - No acute injuries identified. No indications for surgical intervention at this time  - Recommend admission to medicine for workup of her current pneumonia.    Good Johnston MD  [employer] [facility]

## 2022-09-18 NOTE — PROGRESS NOTES
Pharmacokinetic Initial Assessment: IV Vancomycin    Assessment/Plan:  Will pulse dose due to poor renal function  Initiate intravenous vancomycin with loading dose of 1500 mg once   Desired empiric serum trough concentration is 15 to 20 mcg/mL  Draw vancomycin random on 9/19 @0500  Pharmacy will continue to follow and monitor vancomycin.      Please contact pharmacy at extension [phone] with any questions regarding this assessment.     Thank you for the consult,   Jovi Mccullough       Patient brief summary:  Mena Conti is a 89 y.o. female initiated on antimicrobial therapy with IV Vancomycin for treatment of suspected  PNA    Drug Allergies:   Review of patient's allergies indicates:  Not on File    Actual Body Weight:   79 kg    Renal Function:   Estimated Creatinine Clearance: 21.4 mL/min (A) (based on SCr of 1.78 mg/dL (H)).,     Dialysis Method (if applicable):  N/A    CBC (last 72 hours):  Recent Labs   Lab Result Units 09/18/22  1211   WBC x10(3)/mcL 5.3   Hgb gm/dL 11.7*   Hct % 40.3   Platelet x10(3)/mcL 154   Monocyte Man % 14       Metabolic Panel (last 72 hours):  Recent Labs   Lab Result Units 09/18/22  1211   Sodium Level mmol/L 140   Potassium Level mmol/L 5.4*   Chloride mmol/L 108*   Carbon Dioxide mmol/L 18*   Glucose Level mg/dL 246*   Blood Urea Nitrogen mg/dL 26.3*   Creatinine mg/dL 1.78*   Albumin Level gm/dL 2.8*   Bilirubin Total mg/dL 0.7   Alkaline Phosphatase unit/L 52   Aspartate Aminotransferase unit/L 24   Alanine Aminotransferase unit/L 12       Drug levels (last 3 results):  No results for input(s): VANCOMYCINRA, VANCORANDOM, VANCOMYCINPE, VANCOPEAK, VANCOMYCINTR, VANCOTROUGH in the last 72 hours.    Microbiologic Results:  Microbiology Results (last 7 days)       ** No results found for the last 168 hours. **

## 2022-09-18 NOTE — ED NOTES
Assumed care at this time, patient  GCS now 13, able to arouse by voice when called by name, no obvious deformities noted, NAD, voices no complaints denies any pain. Patient is calm and cooperative able to follow commands. Patient found down by family at home, originally GCS of 6.

## 2022-09-19 PROBLEM — J18.9 PNEUMONIA OF LEFT LOWER LOBE DUE TO INFECTIOUS ORGANISM: Status: ACTIVE | Noted: 2022-09-19

## 2022-09-19 LAB
ABS NEUT (OLG): 4.32 X10(3)/MCL (ref 2.1–9.2)
ALBUMIN SERPL-MCNC: 2.6 GM/DL (ref 3.4–4.8)
ALBUMIN/GLOB SERPL: 1 RATIO (ref 1.1–2)
ALP SERPL-CCNC: 52 UNIT/L (ref 40–150)
ALT SERPL-CCNC: 15 UNIT/L (ref 0–55)
AMPHET UR QL SCN: NEGATIVE
APPEARANCE UR: CLEAR
AST SERPL-CCNC: 30 UNIT/L (ref 5–34)
AV INDEX (PROSTH): 0.6
AV MEAN GRADIENT: 5 MMHG
AV PEAK GRADIENT: 10 MMHG
AV VELOCITY RATIO: 0.6
BACTERIA #/AREA URNS AUTO: NORMAL /HPF
BARBITURATE SCN PRESENT UR: NEGATIVE
BENZODIAZ UR QL SCN: NEGATIVE
BILIRUB UR QL STRIP.AUTO: NEGATIVE MG/DL
BILIRUBIN DIRECT+TOT PNL SERPL-MCNC: 0.7 MG/DL
BSA FOR ECHO PROCEDURE: 1.85 M2
BUN SERPL-MCNC: 30.3 MG/DL (ref 9.8–20.1)
BURR CELLS (OLG): ABNORMAL
CALCIUM SERPL-MCNC: 8.4 MG/DL (ref 8.4–10.2)
CANNABINOIDS UR QL SCN: NEGATIVE
CHLORIDE SERPL-SCNC: 110 MMOL/L (ref 98–107)
CO2 SERPL-SCNC: 16 MMOL/L (ref 23–31)
COCAINE UR QL SCN: NEGATIVE
COLOR UR AUTO: YELLOW
CREAT SERPL-MCNC: 1.48 MG/DL (ref 0.55–1.02)
CV ECHO LV RWT: 0.71 CM
DOP CALC AO PEAK VEL: 1.6 M/S
DOP CALC AO VTI: 30 CM
DOP CALC LVOT PEAK VEL: 0.96 M/S
DOP CALCLVOT PEAK VEL VTI: 18.1 CM
E WAVE DECELERATION TIME: 243 MSEC
E/A RATIO: 1.11
E/E' RATIO: 11.2 M/S
ECHO LV POSTERIOR WALL: 1.27 CM (ref 0.6–1.1)
EJECTION FRACTION: 60 %
ERYTHROCYTE [DISTWIDTH] IN BLOOD BY AUTOMATED COUNT: 14 % (ref 11.5–17)
FENTANYL UR QL SCN: NEGATIVE
FRACTIONAL SHORTENING: 16 % (ref 28–44)
GFR SERPLBLD CREATININE-BSD FMLA CKD-EPI: 35 MLS/MIN/1.73/M2
GLOBULIN SER-MCNC: 2.5 GM/DL (ref 2.4–3.5)
GLUCOSE SERPL-MCNC: 145 MG/DL (ref 82–115)
GLUCOSE UR QL STRIP.AUTO: NEGATIVE MG/DL
HCT VFR BLD AUTO: 41.1 % (ref 37–47)
HGB BLD-MCNC: 12.6 GM/DL (ref 12–16)
IMM GRANULOCYTES # BLD AUTO: 0.02 X10(3)/MCL (ref 0–0.04)
IMM GRANULOCYTES NFR BLD AUTO: 0.4 %
INSTRUMENT WBC (OLG): 6 X10(3)/MCL
INTERVENTRICULAR SEPTUM: 0.86 CM (ref 0.6–1.1)
KETONES UR QL STRIP.AUTO: NEGATIVE MG/DL
LACTATE SERPL-SCNC: 2.7 MMOL/L (ref 0.5–2.2)
LACTATE SERPL-SCNC: 3.2 MMOL/L (ref 0.5–2.2)
LACTATE SERPL-SCNC: 3.2 MMOL/L (ref 0.5–2.2)
LACTATE SERPL-SCNC: 3.3 MMOL/L (ref 0.5–2.2)
LACTATE SERPL-SCNC: 3.7 MMOL/L (ref 0.5–2.2)
LEFT ATRIUM SIZE: 3.3 CM
LEFT ATRIUM VOLUME INDEX MOD: 34.6 ML/M2
LEFT ATRIUM VOLUME MOD: 64 CM3
LEFT INTERNAL DIMENSION IN SYSTOLE: 3.02 CM (ref 2.1–4)
LEFT VENTRICLE DIASTOLIC VOLUME INDEX: 29.42 ML/M2
LEFT VENTRICLE DIASTOLIC VOLUME: 54.43 ML
LEFT VENTRICLE MASS INDEX: 64 G/M2
LEFT VENTRICLE SYSTOLIC VOLUME INDEX: 19.2 ML/M2
LEFT VENTRICLE SYSTOLIC VOLUME: 35.57 ML
LEFT VENTRICULAR INTERNAL DIMENSION IN DIASTOLE: 3.6 CM (ref 3.5–6)
LEFT VENTRICULAR MASS: 118.31 G
LEUKOCYTE ESTERASE UR QL STRIP.AUTO: NEGATIVE UNIT/L
LV LATERAL E/E' RATIO: 11.2 M/S
LV SEPTAL E/E' RATIO: 11.2 M/S
LVOT MG: 2 MMHG
LVOT MV: 0.61 CM/S
LYMPHOCYTES NFR BLD MANUAL: 1.38 X10(3)/MCL
LYMPHOCYTES NFR BLD MANUAL: 23 %
MCH RBC QN AUTO: 29.2 PG (ref 27–31)
MCHC RBC AUTO-ENTMCNC: 30.7 MG/DL (ref 33–36)
MCV RBC AUTO: 95.4 FL (ref 80–94)
MDMA UR QL SCN: NEGATIVE
METAMYELOCYTES NFR BLD MANUAL: 4 %
MONOCYTES NFR BLD MANUAL: 0.3 X10(3)/MCL (ref 0.1–1.3)
MONOCYTES NFR BLD MANUAL: 5 %
MV PEAK A VEL: 1.01 M/S
MV PEAK E VEL: 1.12 M/S
NEUTROPHILS NFR BLD MANUAL: 68 %
NITRITE UR QL STRIP.AUTO: NEGATIVE
NRBC BLD AUTO-RTO: 0 %
OPIATES UR QL SCN: NEGATIVE
PCP UR QL: NEGATIVE
PH UR STRIP.AUTO: 5.5 [PH]
PH UR: 5.5 [PH] (ref 3–11)
PLATELET # BLD AUTO: 136 X10(3)/MCL (ref 130–400)
PLATELET # BLD EST: NORMAL 10*3/UL
PMV BLD AUTO: 11.6 FL (ref 7.4–10.4)
POCT GLUCOSE: 144 MG/DL (ref 70–110)
POCT GLUCOSE: 165 MG/DL (ref 70–110)
POCT GLUCOSE: 171 MG/DL (ref 70–110)
POCT GLUCOSE: 182 MG/DL (ref 70–110)
POCT GLUCOSE: 183 MG/DL (ref 70–110)
POIKILOCYTOSIS BLD QL SMEAR: ABNORMAL
POTASSIUM SERPL-SCNC: 4.8 MMOL/L (ref 3.5–5.1)
PROT SERPL-MCNC: 5.1 GM/DL (ref 5.8–7.6)
PROT UR QL STRIP.AUTO: ABNORMAL MG/DL
PV PEAK VELOCITY: 1.17 CM/S
RBC # BLD AUTO: 4.31 X10(6)/MCL (ref 4.2–5.4)
RBC #/AREA URNS AUTO: <5 /HPF
RBC MORPH BLD: ABNORMAL
RBC UR QL AUTO: NEGATIVE UNIT/L
SODIUM SERPL-SCNC: 137 MMOL/L (ref 136–145)
SP GR UR STRIP.AUTO: >=1.04 (ref 1–1.03)
SPECIFIC GRAVITY, URINE AUTO (.000) (OHS): 1.05 (ref 1–1.03)
SQUAMOUS #/AREA URNS AUTO: <5 /HPF
TDI LATERAL: 0.1 M/S
TDI SEPTAL: 0.1 M/S
TDI: 0.1 M/S
TRICUSPID ANNULAR PLANE SYSTOLIC EXCURSION: 1.57 CM
UROBILINOGEN UR STRIP-ACNC: 1 MG/DL
WBC # SPEC AUTO: 5.6 X10(3)/MCL (ref 4.5–11.5)
WBC #/AREA URNS AUTO: <5 /HPF

## 2022-09-19 PROCEDURE — 25000003 PHARM REV CODE 250: Performed by: INTERNAL MEDICINE

## 2022-09-19 PROCEDURE — 80053 COMPREHEN METABOLIC PANEL: CPT | Performed by: PHYSICIAN ASSISTANT

## 2022-09-19 PROCEDURE — 63600175 PHARM REV CODE 636 W HCPCS: Performed by: INTERNAL MEDICINE

## 2022-09-19 PROCEDURE — 25000003 PHARM REV CODE 250

## 2022-09-19 PROCEDURE — 63600175 PHARM REV CODE 636 W HCPCS: Performed by: PHYSICIAN ASSISTANT

## 2022-09-19 PROCEDURE — 94640 AIRWAY INHALATION TREATMENT: CPT

## 2022-09-19 PROCEDURE — 93005 ELECTROCARDIOGRAM TRACING: CPT

## 2022-09-19 PROCEDURE — 81001 URINALYSIS AUTO W/SCOPE: CPT

## 2022-09-19 PROCEDURE — 36415 COLL VENOUS BLD VENIPUNCTURE: CPT | Performed by: PHYSICIAN ASSISTANT

## 2022-09-19 PROCEDURE — 93010 ELECTROCARDIOGRAM REPORT: CPT | Mod: ,,, | Performed by: INTERNAL MEDICINE

## 2022-09-19 PROCEDURE — 93010 EKG 12-LEAD: ICD-10-PCS | Mod: ,,, | Performed by: INTERNAL MEDICINE

## 2022-09-19 PROCEDURE — 25000003 PHARM REV CODE 250: Performed by: EMERGENCY MEDICINE

## 2022-09-19 PROCEDURE — 36415 COLL VENOUS BLD VENIPUNCTURE: CPT | Performed by: INTERNAL MEDICINE

## 2022-09-19 PROCEDURE — 25000003 PHARM REV CODE 250: Performed by: PHYSICIAN ASSISTANT

## 2022-09-19 PROCEDURE — 94761 N-INVAS EAR/PLS OXIMETRY MLT: CPT

## 2022-09-19 PROCEDURE — 25000242 PHARM REV CODE 250 ALT 637 W/ HCPCS: Performed by: INTERNAL MEDICINE

## 2022-09-19 PROCEDURE — 27000221 HC OXYGEN, UP TO 24 HOURS

## 2022-09-19 PROCEDURE — 80307 DRUG TEST PRSMV CHEM ANLYZR: CPT

## 2022-09-19 PROCEDURE — 85027 COMPLETE CBC AUTOMATED: CPT | Performed by: PHYSICIAN ASSISTANT

## 2022-09-19 PROCEDURE — 21400001 HC TELEMETRY ROOM

## 2022-09-19 PROCEDURE — 83605 ASSAY OF LACTIC ACID: CPT | Performed by: INTERNAL MEDICINE

## 2022-09-19 RX ORDER — DONEPEZIL HYDROCHLORIDE 5 MG/1
10 TABLET, FILM COATED ORAL NIGHTLY
Status: DISCONTINUED | OUTPATIENT
Start: 2022-09-19 | End: 2022-09-23 | Stop reason: HOSPADM

## 2022-09-19 RX ORDER — BENZONATATE 100 MG/1
200 CAPSULE ORAL 3 TIMES DAILY PRN
Status: DISCONTINUED | OUTPATIENT
Start: 2022-09-19 | End: 2022-09-23 | Stop reason: HOSPADM

## 2022-09-19 RX ORDER — MEMANTINE HYDROCHLORIDE 5 MG/1
10 TABLET ORAL 2 TIMES DAILY
Status: DISCONTINUED | OUTPATIENT
Start: 2022-09-19 | End: 2022-09-23 | Stop reason: HOSPADM

## 2022-09-19 RX ORDER — IPRATROPIUM BROMIDE AND ALBUTEROL SULFATE 2.5; .5 MG/3ML; MG/3ML
3 SOLUTION RESPIRATORY (INHALATION) EVERY 6 HOURS PRN
Status: DISCONTINUED | OUTPATIENT
Start: 2022-09-19 | End: 2022-09-20

## 2022-09-19 RX ORDER — PRAVASTATIN SODIUM 40 MG/1
40 TABLET ORAL NIGHTLY
COMMUNITY
Start: 2022-09-14

## 2022-09-19 RX ORDER — MONTELUKAST SODIUM 10 MG/1
10 TABLET ORAL NIGHTLY
Status: DISCONTINUED | OUTPATIENT
Start: 2022-09-19 | End: 2022-09-23 | Stop reason: HOSPADM

## 2022-09-19 RX ORDER — FLUTICASONE FUROATE, UMECLIDINIUM BROMIDE AND VILANTEROL TRIFENATATE 100; 62.5; 25 UG/1; UG/1; UG/1
1 POWDER RESPIRATORY (INHALATION) DAILY
COMMUNITY
Start: 2022-09-02

## 2022-09-19 RX ORDER — PANTOPRAZOLE SODIUM 40 MG/1
40 TABLET, DELAYED RELEASE ORAL DAILY
Status: DISCONTINUED | OUTPATIENT
Start: 2022-09-20 | End: 2022-09-23 | Stop reason: HOSPADM

## 2022-09-19 RX ADMIN — PIPERACILLIN AND TAZOBACTAM 4.5 G: 4; .5 INJECTION, POWDER, LYOPHILIZED, FOR SOLUTION INTRAVENOUS; PARENTERAL at 08:09

## 2022-09-19 RX ADMIN — BENZONATATE 200 MG: 100 CAPSULE ORAL at 10:09

## 2022-09-19 RX ADMIN — INSULIN ASPART 2 UNITS: 100 INJECTION, SOLUTION INTRAVENOUS; SUBCUTANEOUS at 06:09

## 2022-09-19 RX ADMIN — DOCUSATE SODIUM 100 MG: 100 CAPSULE, LIQUID FILLED ORAL at 09:09

## 2022-09-19 RX ADMIN — ACETAMINOPHEN 650 MG: 325 TABLET ORAL at 09:09

## 2022-09-19 RX ADMIN — IPRATROPIUM BROMIDE AND ALBUTEROL SULFATE 3 ML: 2.5; .5 SOLUTION RESPIRATORY (INHALATION) at 07:09

## 2022-09-19 RX ADMIN — DONEPEZIL HYDROCHLORIDE 10 MG: 5 TABLET, FILM COATED ORAL at 09:09

## 2022-09-19 RX ADMIN — ACETAMINOPHEN 650 MG: 325 TABLET ORAL at 01:09

## 2022-09-19 RX ADMIN — SODIUM ZIRCONIUM CYCLOSILICATE 10 G: 10 POWDER, FOR SUSPENSION ORAL at 08:09

## 2022-09-19 RX ADMIN — MIRTAZAPINE 7.5 MG: 7.5 TABLET ORAL at 09:09

## 2022-09-19 RX ADMIN — DOCUSATE SODIUM 100 MG: 100 CAPSULE, LIQUID FILLED ORAL at 08:09

## 2022-09-19 RX ADMIN — MIRTAZAPINE 7.5 MG: 7.5 TABLET ORAL at 12:09

## 2022-09-19 RX ADMIN — SODIUM CHLORIDE, POTASSIUM CHLORIDE, SODIUM LACTATE AND CALCIUM CHLORIDE 2000 ML: 600; 310; 30; 20 INJECTION, SOLUTION INTRAVENOUS at 12:09

## 2022-09-19 RX ADMIN — PIPERACILLIN AND TAZOBACTAM 4.5 G: 4; .5 INJECTION, POWDER, LYOPHILIZED, FOR SOLUTION INTRAVENOUS; PARENTERAL at 05:09

## 2022-09-19 RX ADMIN — ENOXAPARIN SODIUM 30 MG: 30 INJECTION SUBCUTANEOUS at 05:09

## 2022-09-19 RX ADMIN — VANCOMYCIN HYDROCHLORIDE 1250 MG: 1.25 INJECTION, POWDER, LYOPHILIZED, FOR SOLUTION INTRAVENOUS at 05:09

## 2022-09-19 RX ADMIN — MEMANTINE 10 MG: 5 TABLET ORAL at 09:09

## 2022-09-19 RX ADMIN — PIPERACILLIN AND TAZOBACTAM 4.5 G: 4; .5 INJECTION, POWDER, LYOPHILIZED, FOR SOLUTION INTRAVENOUS; PARENTERAL at 01:09

## 2022-09-19 RX ADMIN — IPRATROPIUM BROMIDE AND ALBUTEROL SULFATE 3 ML: 2.5; .5 SOLUTION RESPIRATORY (INHALATION) at 01:09

## 2022-09-19 RX ADMIN — MONTELUKAST 10 MG: 10 TABLET, FILM COATED ORAL at 09:09

## 2022-09-19 RX ADMIN — ACETAMINOPHEN 650 MG: 325 TABLET ORAL at 05:09

## 2022-09-19 NOTE — PLAN OF CARE
09/19/22 1442   Discharge Assessment   Assessment Type Discharge Planning Assessment   Source of Information family   If unable to respond/provide information was family/caregiver contacted? Other (see comments)  (Information obtained from granddaughter at bedside.)   Communicated YOLI with patient/caregiver Date not available/Unable to determine   Reason For Admission Pneumonia   Lives With child(cal), adult   Do you expect to return to your current living situation? Yes   Do you have help at home or someone to help you manage your care at home? Yes   Who are your caregiver(s) and their phone number(s)? Pia Conti (daughter) [phone]   Prior to hospitilization cognitive status: Alert/Oriented   Current cognitive status: Unable to Assess   Walking or Climbing Stairs Difficulty ambulation difficulty, requires equipment   Mobility Management Ambulates with walker   Dressing/Bathing Difficulty bathing difficulty, assistance 1 person;dressing difficulty, assistance 1 person   Do you have any problems with: Needs other help   Specify other help Daughtert cooks and providers transportation   Equipment Currently Used at Home walker, rolling;bedside commode;glucometer;cane, straight;other (see comments)  (cpap???)   Do you currently have service(s) that help you manage your care at home? Yes   Name and Contact number of agency [facility] [phone]   Is the pt/caregiver preference to resume services with current agency Yes   Do you take prescription medications? Yes   Do you have prescription coverage? Yes   Coverage [employer]   Do you have any problems affording any of your prescribed medications? No   Is the patient taking medications as prescribed? yes   Who is going to help you get home at discharge? Jose Palacio   How do you get to doctors appointments? family or friend will provide   Are you on dialysis? No   Do you take coumadin? No   Discharge Plan A Home Health   Discharge Plan  B Home Health   Discharge Plan discussed with:   (Granddaughter at beside)   Discharge Barriers Identified None   Relationship/Environment   Name(s) of Who Lives With Patient Daughter Pia Conti

## 2022-09-19 NOTE — PROGRESS NOTES
[facility] Hospital Medicine Progress Note        Chief Complaint: Inpatient follow-up on pneumonia    HPI:   Mena Conti is a 87 year old  female with a past medical history of hypertension, asthma, glaucoma, dementia, diabetes mellitus type 2 and valvular heart disease. The patient presented to [facility] on 9/18/2022 following a fall. Granddaughter who was at bedside states patient told her daughter with whom she lives that she was going to the bathroom. When daughter went to check on her she was found on the ground but awake and alert. Patient denies head injury or loss of consciousness.  Patient has been having a productive cough for the last week.  She presented to her PCP earlier in the week due to blood pressure being elevated at home with a systolic in the 190s and Lyndsay was prescribed.  She began taking Lyndsay approximately 3 days ago.  Patient denies complaints of chest pain, fever, chills, sweats, shortness of breath.  She lives with daughter, ambulates with walker and needs assistance with activities of daily living.     Upon presentation to the ED, patient was febrile with a temperature 100.9° F, blood pressure 105/58 and SpO2 96% on room air.  Patient's blood pressure decreased to 84/53 which responded to IV fluid hydration.  She was placed on supplemental oxygen via nasal cannula which has since been titrated up to 4 L. labs were notable for WBC 5.3, hemoglobin 11.7, hematocrit 40.3, .8, potassium 5.4, CO2 18, BUN/creatinine 26.3/1.78, glucose 246, lactic acid 5.4.  No prior labs for comparison.  Ethanol level less than 10.  Influenza A and B and SARS-CoV-2 PCR negative.  Chest x-ray with moderate left pleural effusion with basilar airspace opacities.  X-ray of the pelvis without fracture.  CT head without acute intracranial abnormalities.  CT of the cervical spine with asymmetry in the interval between the dens and lateral masses of the C1 appears  position with unremarkable alignment of the lateral masses of C1 and C2 and small old avulsed fragment from the right lateral mass of C1.  CT chest, abdomen and pelvis with left basilar consolidative and ground-glass airspace opacity concerning for infection, heterogeneous enhancement of the spleen likely perfusional and a large left hiatal hernia.     Interval Hx:   Patient is sleeping soundly with no acute issues reported.  She is afebrile, on supplemental oxygen at 4 liters/minute via nasal cannula, and hemodynamically stable.    Objective/physical exam:  General:  Elderly  female in no acute distress  HENT: normocephalic, atraumatic, poor dentition  Eye: PERRL, EOMI, clear conjunctiva  Neck: full ROM, no thyromegaly, no JVD  Respiratory: diminished breath sounds bilaterally  Cardiovascular: regular rate and rhythm  Gastrointestinal: non-distended, positive bowel sounds, non-tender  Musculoskeletal: no gross deformity  Integumentary: warm, dry, intact, no rashes  Neurological: cranial nerves grossly intact, no focal neurological deficit  Psychiatric: cooperative, flat affect      VITAL SIGNS: 24 HRS MIN & MAX LAST   Temp  Min: 97.7 °F (36.5 °C)  Max: 99.9 °F (37.7 °C) 97.7 °F (36.5 °C)   BP  Min: 84/54  Max: 155/84 116/71   Pulse  Min: 67  Max: 88  74   Resp  Min: 19  Max: 30 20   SpO2  Min: 90 %  Max: 100 % (!) 93 %         Recent Labs   Lab 09/18/22  1211 09/19/22  0429   WBC 5.3 5.6   RBC 4.00* 4.31   HGB 11.7* 12.6   HCT 40.3 41.1   .8* 95.4*   MCH 29.3 29.2   MCHC 29.0* 30.7*   RDW 13.7 14.0    136   MPV 10.1 11.6*       Recent Labs   Lab 09/18/22  1211 09/19/22  0528    137   K 5.4* 4.8   CO2 18* 16*   BUN 26.3* 30.3*   CREATININE 1.78* 1.48*   CALCIUM 9.0 8.4   ALBUMIN 2.8* 2.6*   ALKPHOS 52 52   ALT 12 15   AST 24 30   BILITOT 0.7 0.7          Microbiology Results (last 7 days)       Procedure Component Value Units Date/Time    Blood culture #2 **CANNOT BE ORDERED  STAT** [[id number]] Collected: 09/18/22 1446    Order Status: Resulted Specimen: Blood Updated: 09/18/22 1502    Blood Culture #1 **CANNOT BE ORDERED STAT** [[id number]] Collected: 09/18/22 1446    Order Status: Resulted Specimen: Blood Updated: 09/18/22 1502             See below for Radiology    Scheduled Med:   acetaminophen  650 mg Oral Q4H    docusate sodium  100 mg Oral BID    enoxaparin  30 mg Subcutaneous Daily    mirtazapine  7.5 mg Oral QHS    piperacillin-tazobactam (ZOSYN) IVPB  4.5 g Intravenous Q8H    sodium zirconium cyclosilicate  10 g Oral TID        Continuous Infusions:   sodium chloride 0.9% 75 mL/hr at 09/18/22 1530        PRN Meds:  acetaminophen, calcium carbonate, dextrose 10 % in water (D10W), dextrose 10%, glucagon (human recombinant), glucose, glucose, insulin aspart U-100, magnesium hydroxide 400 mg/5 ml, melatonin, ondansetron, oxyCODONE, oxyCODONE       Assessment/Plan:  Sepsis  Left lower lobe bacterial pneumonia  Large left hiatal hernia  Acute hypoxic respiratory failure  Metabolic acidosis  Acute kidney injury versus chronic kidney disease  Non-insulin-dependent type 2 diabetes mellitus   Essential hypertension  Glaucoma  Valvular heart disease   Alzheimer's dementia      Plan:   Continue empiric antibiotic therapy, supplemental oxygen, and other supportive care      PS:  At 1:23 p.m. nursing staff notified me of cardiac dysrhythmia noted on telemetry so I have requested a Cardiology evaluation.      PPS:  At 1:46 p.m. nursing staff notified me of a positive blood culture with Gram-positive cocci probable staphylococcus so I have initiated pharmacy dosed vancomycin.    Critical Care Diagnosis: Acute hypoxic respiratory failure requiring high flow supplemental oxygen; sepsis requiring broad-spectrum intravenous antibiotics  Critical Care Interventions: Hands-on evaluation, review of labs, radiographs, medical records, and discussion with the patient and medical staff in order to assess  and manage the high probability of imminent or life-threatening deterioration of cardio-respiratory status requiring vasopressor support and/or intubation and mechanical ventilation.  Critical Care Time Spent: 35 minutes    VTE prophylaxis:  Lovenox    Patient condition:  Stable    Anticipated discharge and Disposition:     TBD    All diagnosis and differential diagnosis have been reviewed; assessment and plan has been documented; I have personally reviewed the labs and test results that are presently available; I have reviewed the patients medication list; I have reviewed the consulting providers response and recommendations. I have reviewed or attempted to review medical records based upon their availability    All of the patient's questions have been  addressed and answered. Patient's is agreeable to the above stated plan. I will continue to monitor closely and make adjustments to medical management as needed.  _____________________________________________________________________        Radiology:  CT Chest Abdomen Pelvis With Contrast (xpd)  Narrative: EXAMINATION:  CT CHEST ABDOMEN PELVIS WITH CONTRAST (XPD)    CLINICAL HISTORY:  Polytrauma, blunt;    TECHNIQUE:  CT of the chest, abdomen and pelvis WITH intravenous contrast. The chest, abdomen and pelvis were scanned utilizing a multidetector helical scanner from the lung apex to the lesser trochanter after the administration of intravenous contrast.    Coronal and sagittal reconstructions were obtained from the axial data set.    Automatic exposure control was utilized to limit radiation dose.    Radiation Dose:    Total DLP: 609 mGy*cm    COMPARISON:  None    FINDINGS:  LINES/TUBES: None.    AIRWAYS: Clear centrally.    LUNGS: There are consolidative and ground-glass airspace opacities in the left lung base.    PLEURA: No pleural effusions. No pneumothoraces.    HEART AND MEDIASTINUM: The heart is normal in size.  There is no pericardial effusion.  There is  no evidence of a thoracic aortic injury.    SOFT TISSUES: Normal.    THORACIC BONES: No acute bony pathology.    ABDOMEN/PELVIS:    Evaluation is limited by motion artifact.    HEPATOBILIARY: The liver is slightly nodular in contour.  No evidence of acute injury.    SPLEEN: Heterogeneous enhancement in the spleen may be perfusional.    PANCREAS: Unremarkable.    ADRENALS: 1.5 cm right adrenal myelolipoma.    KIDNEYS/URETERS: No evidence of acute injury.    PELVIC ORGANS/BLADDER: The uterus has been surgically resected.  The bladder is unremarkable.    PERITONEUM/RETROPERITONEUM: No free intraperitoneal air. No free fluid.    LYMPH NODES: No lymphadenopathy.    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    GI TRACT: No bowel obstruction.  Large hiatal hernia with nearly all the stomach in the left hemithorax.    ABDOMINOPELVIC BONES AND SOFT TISSUE: Soft tissues within normal limits. No acute bony pathology.  Severe degenerative changes of the right hip.  Impression: 1. Left basilar consolidative and ground-glass airspace opacities, concerning for infection.  Recommend follow-up after treatment to ensure resolution.  2. Heterogeneous enhancement of the spleen is likely perfusional.  3. Large left hiatal hernia.    Electronically signed by: Ragini Zamorano  Date:    09/18/2022  Time:    13:05  CT Cervical Spine Without Contrast  Narrative: EXAMINATION:  CT CERVICAL SPINE WITHOUT CONTRAST    CLINICAL HISTORY:  Trauma.    TECHNIQUE:  Multidetector axial images were performed of the cervical spine without and.  Images were reconstructed.    Automated exposure control was utilized to minimize radiation dose.  DLP 1960.    COMPARISON:  None available.    FINDINGS:  There is decrease interval on the right between the dens and the right lateral mass of C1 which may be positional without loss of alignment of the lateral masses of C1 and C2.  There is small corticated  ossification adjacent to the right lateral mass of C1 on image 20 series 9 which is not convinced to be acute.   No acute appearing fracture or malalignment identified.  There is grade 1 anterolisthesis of C4 on C5.  Cervical vertebrae is stature preserved.  There are multilevel degenerative changes which cause ventral impression upon the thecal sac and narrowings of the neural foramen.  There is no prevertebral soft tissue prominence.    This study does not exclude the possibility of intrathecal soft tissue, ligamentous or vascular injury.  Impression: 1.  Asymmetry in the interval between dens and lateral masses of C1 appears positional with unremarkable alignment of lateral masses of C1 and C2.    2.  Small old avulsed fragment from right lateral mass of C1.    No definite acute fracture or malalignment identified    Electronically signed by: Amando Reyes  Date:    09/18/2022  Time:    12:55  CT Head Without Contrast  Narrative: EXAMINATION:  CT HEAD WITHOUT CONTRAST    CLINICAL HISTORY:  Trauma;    TECHNIQUE:  Sequential axial images were performed of the brain without contrast.    Dose product length of 1960 mGycm. Automated exposure control was utilized to minimize radiation dose.    COMPARISON:  None available.    FINDINGS:  There is no intracranial mass effect, midline shift, hydrocephalus or hemorrhage. There is no sulcal effacement or low attenuation changes to suggest recent large vessel territory infarction.  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  Chronic appearing periventricular and subcortical white matter low attenuation changes are present and are consistent with chronic microangiopathic ischemia. The ventricular system and sulcal markings prominence is consistent with atrophy. There is no acute extra axial fluid collection.  There is no acute depressed skull fracture.  Visualized paranasal sinuses are clear without mucosal thickening, polypoidal abnormality or air-fluid levels. Mastoid air cells  aeration is optimal.  Impression: No acute intracranial findings identified.    Electronically signed by: Amando Reyes  Date:    09/18/2022  Time:    12:47  X-Ray Pelvis Routine AP  Narrative: EXAMINATION:  XR PELVIS ROUTINE AP    CLINICAL HISTORY:  r/o bleeding or hemorrhage;    COMPARISON:  None.    FINDINGS:  Evaluation is limited by overlying external line.  There is no definite displaced fracture identified.  There are severe degenerative changes of the hips, right greater left, with joint space loss and osteophyte formation.  The soft tissues are unremarkable.  Impression: No definite displaced fracture identified.    Electronically signed by: Ragini Zamorano  Date:    09/18/2022  Time:    12:42  X-Ray Chest 1 View  Narrative: EXAMINATION:  XR CHEST 1 VIEW    CLINICAL HISTORY:  r/o bleeding or hemorrhage;    TECHNIQUE:  AP chest    COMPARISON:  None.    FINDINGS:  The heart is enlarged.  There is a moderate left pleural effusion with overlying basilar airspace opacities.  The right lung is clear.  There is no definite visible pneumothorax.  There is no definite displaced fracture identified.  Impression: Moderate left pleural effusion with basilar airspace opacities.    Electronically signed by: Ragini Zamorano  Date:    09/18/2022  Time:    12:40      Jelani Tubbs MD   09/19/2022

## 2022-09-19 NOTE — CONSULTS
Inpatient consult to Cardiology  Consult performed by: MARISSA Gillette  Consult ordered by: Jelani Tubbs MD    [facility] - 4th Floor Medical Telemetry  Cardiology  Consult Note    Patient Name: Mena Conti  MRN: [id number]  Admission Date: 9/18/2022  Hospital Length of Stay: 1 days  Code Status: Full Code   Attending Provider: Marino Lawson MD   Consulting Provider: MARISSA Gillette  Primary Care Physician: No primary care provider on file.  Principal Problem:Pneumonia of left lower lobe due to infectious organism    Patient information was obtained from relative(s) and ER records.     Subjective:     HPI:   Ms. Conti is an 88 y/o female, followed by Dr. Raymond, with PMHx significant for HTN, HLD, VHD- Moderate MR, Grade I DD, and COPD who presented to ED as level 1 trauma post unwitnessed GLF resulting in hitting head and brief LOC. She was found to be hypotensive/febrile in ED (BP 84/53,P 93, T 100.9F) CT head negative for acute intracranial findings. Cervical CT and xray pelvis negative for acute fracture. CT chest/abdomen and pelvis reveals large hiatal hernia and left basilar consolidation with ground glass airspace opacities, concerning for infection. Lab significant for K+ 5.4, BUN/Creatinine 26.3/1.78, Lactate up to 6.9, down to 3.5 today post IVFs. Negative Influenza A/B and COVID. Blood cultures obtained + for GPC, probable staph. Abx initiated. EKG in chart is unconfirmed and incorrectly reads Atrial fibrillation but is actually SR with RBBB. Followup telemetry is irregular but appears to be SR with PACS. Second EKG obtained to verify [facility] has been consulted for arrhythmia.     PMH: HTN, HLD, VHD-moderate MR, RBBB, hernia  PSH: Hysterectomy, bilateral cataract surgery  Family History:   Social History: mother- heart disease, T2DM. Brother- HTN, T2DM. Sister- HTN, CVA, breast cancer, T2DM    Previous Cardiac Diagnostics:   CUS 08/09/2021:  1-39% stenosis in  proximal ISAAC  1-39% stenosis in proximal LICA  Bilateral antegrade vertebral artery flow    TTE 08/09/2021:  LV decreased in size. Noted mild to moderate concentric LVH. Global LVSF normal. LV diastolic function with Grade I DD with normal LA pressure. LVEF 65%. Mild calcification of the AoV is noted with adequate cuspal excuresion. 2+ MR. 1+ TR/OK. PASP 29mmhg.    Review of Systems   Unable to perform ROS: Dementia     Objective:     Vital Signs (Most Recent):  Temp: 97.7 °F (36.5 °C) (09/19/22 1048)  Pulse: 88 (09/19/22 1313)  Resp: 20 (09/19/22 1313)  BP: 116/71 (09/19/22 1048)  SpO2: 97 % (09/19/22 1313) Vital Signs (24h Range):  Temp:  [97.7 °F (36.5 °C)-98.1 °F (36.7 °C)] 97.7 °F (36.5 °C)  Pulse:  [67-88] 88  Resp:  [20-30] 20  SpO2:  [90 %-100 %] 97 %  BP: ()/(52-84) 116/71     Weight: 71.5 kg (157 lb 10.1 oz)  Body mass index is 23.97 kg/m².    SpO2: 97 %  O2 Device (Oxygen Therapy): nasal cannula      Intake/Output Summary (Last 24 hours) at 9/19/2022 1411  Last data filed at 9/19/2022 0900  Gross per 24 hour   Intake 3339 ml   Output --   Net 3339 ml       Lines/Drains/Airways       Peripheral Intravenous Line  Duration                  Peripheral IV - Single Lumen 09/18/22 1115 22 G Right Antecubital 1 day         Peripheral IV - Single Lumen 09/18/22 1430 20 G Lateral;Left Antecubital <1 day         Peripheral IV - Single Lumen 09/18/22 1445 20 G Anterior;Left Forearm <1 day                    Significant Labs:  Recent Results (from the past 72 hour(s))   Lactic Acid, Plasma    Collection Time: 09/18/22 12:10 PM   Result Value Ref Range    Lactic Acid Level 5.4 (HH) 0.5 - 2.2 mmol/L   Comprehensive metabolic panel    Collection Time: 09/18/22 12:11 PM   Result Value Ref Range    Sodium Level 140 136 - 145 mmol/L    Potassium Level 5.4 (H) 3.5 - 5.1 mmol/L    Chloride 108 (H) 98 - 107 mmol/L    Carbon Dioxide 18 (L) 23 - 31 mmol/L    Glucose Level 246 (H) 82 - 115 mg/dL    Blood Urea Nitrogen 26.3  (H) 9.8 - 20.1 mg/dL    Creatinine 1.78 (H) 0.55 - 1.02 mg/dL    Calcium Level Total 9.0 8.4 - 10.2 mg/dL    Protein Total 5.4 (L) 5.8 - 7.6 gm/dL    Albumin Level 2.8 (L) 3.4 - 4.8 gm/dL    Globulin 2.6 2.4 - 3.5 gm/dL    Albumin/Globulin Ratio 1.1 1.1 - 2.0 ratio    Bilirubin Total 0.7 <=1.5 mg/dL    Alkaline Phosphatase 52 40 - 150 unit/L    Alanine Aminotransferase 12 0 - 55 unit/L    Aspartate Aminotransferase 24 5 - 34 unit/L    eGFR 28 mls/min/1.73/m2   Ethanol    Collection Time: 09/18/22 12:11 PM   Result Value Ref Range    Ethanol Level <10.0 <=10.0 mg/dL   CBC with Differential    Collection Time: 09/18/22 12:11 PM   Result Value Ref Range    WBC 5.3 4.5 - 11.5 x10(3)/mcL    RBC 4.00 (L) 4.20 - 5.40 x10(6)/mcL    Hgb 11.7 (L) 12.0 - 16.0 gm/dL    Hct 40.3 37.0 - 47.0 %    .8 (H) 80.0 - 94.0 fL    MCH 29.3 27.0 - 31.0 pg    MCHC 29.0 (L) 33.0 - 36.0 mg/dL    RDW 13.7 11.5 - 17.0 %    Platelet 154 130 - 400 x10(3)/mcL    MPV 10.1 7.4 - 10.4 fL    IG# 0.02 0 - 0.04 x10(3)/mcL    IG% 0.4 %    NRBC% 0.0 %   Manual Differential    Collection Time: 09/18/22 12:11 PM   Result Value Ref Range    Neut Man 53 %    Lymph Man 18 %    Monocyte Man 14 %    [place] Man 10 %    Myelo Man 4 %    Instr WBC 5.3 x10(3)/mcL    Abs Mono 0.742 0.1 - 1.3 x10(3)/mcL    Abs Lymp 0.954 0.6 - 4.6 x10(3)/mcL    Abs Neut 3.551 2.1 - 9.2 x10(3)/mcL    Platelet Est Normal Normal, Adequate   COVID/FLU A&B PCR    Collection Time: 09/18/22 12:52 PM   Result Value Ref Range    Influenza A PCR Not Detected Not Detected    Influenza B PCR Not Detected Not Detected    SARS-CoV-2 PCR Not Detected Not Detected   Protime-INR    Collection Time: 09/18/22  2:46 PM   Result Value Ref Range    PT 17.0 (H) 12.5 - 14.5 seconds    INR 1.41 (H) 0.00 - 1.30   APTT    Collection Time: 09/18/22  2:46 PM   Result Value Ref Range    PTT 32.2 23.2 - 33.7 seconds   Blood culture #2 **CANNOT BE ORDERED STAT**    Collection Time: 09/18/22  2:46 PM    Specimen:  Blood   Result Value Ref Range    GRAM STAIN Gram Positive Cocci, probable Staphylococcus (AA)     GRAM STAIN Seen in gram stain of broth only (AA)     GRAM STAIN 1 of 1 Aerobic bottle positive (AA)    COVID/FLU A&B PCR    Collection Time: 09/18/22  3:14 PM   Result Value Ref Range    Influenza A PCR Not Detected Not Detected    Influenza B PCR Not Detected Not Detected    SARS-CoV-2 PCR Not Detected Not Detected   Lactic Acid, Plasma    Collection Time: 09/18/22  3:14 PM   Result Value Ref Range    Lactic Acid Level 4.2 (HH) 0.5 - 2.2 mmol/L   POCT glucose    Collection Time: 09/18/22  7:14 PM   Result Value Ref Range    POCT Glucose 189 (H) 70 - 110 mg/dL   POCT Glucose, Hand-Held Device    Collection Time: 09/18/22  7:16 PM   Result Value Ref Range    POC Glucose 189 (A) 70 - 110 MG/DL   Lactic Acid, Plasma    Collection Time: 09/18/22 11:16 PM   Result Value Ref Range    Lactic Acid Level 6.9 (HH) 0.5 - 2.2 mmol/L   Urinalysis, Reflex to Urine Culture    Collection Time: 09/19/22  4:13 AM    Specimen: Urine   Result Value Ref Range    Color, UA Yellow Yellow, Colorless, Other, Clear    Appearance, UA Clear Clear    Specific Gravity, UA >=1.040 (H) 1.001 - 1.030    pH, UA 5.5 5.0, 5.5, 6.0, 6.5, 7.0, 7.5, 8.0, 8.5    Protein, UA 1+ (A) Negative, 300  mg/dL    Glucose, UA Negative Negative, Normal mg/dL    Ketones, UA Negative Negative, +1, +2, +3, +4, +5, >=160, >=80 mg/dL    Blood, UA Negative Negative unit/L    Bilirubin, UA Negative Negative mg/dL    Urobilinogen, UA 1.0 0.2, 1.0, Normal mg/dL    Nitrites, UA Negative Negative    Leukocyte Esterase, UA Negative Negative, 75  unit/L   Drug Screen, Urine    Collection Time: 09/19/22  4:13 AM   Result Value Ref Range    Amphetamines, Urine Negative Negative    Barbituates, Urine Negative Negative    Benzodiazepine, Urine Negative Negative    Cannabinoids, Urine Negative Negative    Cocaine, Urine Negative Negative    Fentanyl, Urine Negative Negative    MDMA,  Urine Negative Negative    Opiates, Urine Negative Negative    Phencyclidine, Urine Negative Negative    pH, Urine 5.5 3.0 - 11.0    Specific Gravity, Urine Auto 1.049 (H) 1.001 - 1.035   Urinalysis, Microscopic    Collection Time: 09/19/22  4:13 AM   Result Value Ref Range    RBC, UA <5 <=5 /HPF    WBC, UA <5 <=5 /HPF    Squamous Epithelial Cells, UA <5 <=5 /HPF    Bacteria, UA None Seen None Seen, Rare, Occasional /HPF   CBC with Differential    Collection Time: 09/19/22  4:29 AM   Result Value Ref Range    WBC 5.6 4.5 - 11.5 x10(3)/mcL    RBC 4.31 4.20 - 5.40 x10(6)/mcL    Hgb 12.6 12.0 - 16.0 gm/dL    Hct 41.1 37.0 - 47.0 %    MCV 95.4 (H) 80.0 - 94.0 fL    MCH 29.2 27.0 - 31.0 pg    MCHC 30.7 (L) 33.0 - 36.0 mg/dL    RDW 14.0 11.5 - 17.0 %    Platelet 136 130 - 400 x10(3)/mcL    MPV 11.6 (H) 7.4 - 10.4 fL    IG# 0.02 0 - 0.04 x10(3)/mcL    IG% 0.4 %    NRBC% 0.0 %   Manual Differential    Collection Time: 09/19/22  4:29 AM   Result Value Ref Range    Neut Man 68 %    Lymph Man 23 %    Monocyte Man 5 %    [place] Man 4 %    Instr WBC 6 x10(3)/mcL    Abs Mono 0.3 0.1 - 1.3 x10(3)/mcL    Abs Lymp 1.38 0.6 - 4.6 x10(3)/mcL    Abs Neut 4.32 2.1 - 9.2 x10(3)/mcL    RBC Morph Abnormal (A) Normal    Poik 1+ (A) (none)    Friona Cells 1+ (A) (none)    Platelet Est Normal Normal, Adequate   Comprehensive Metabolic Panel    Collection Time: 09/19/22  5:28 AM   Result Value Ref Range    Sodium Level 137 136 - 145 mmol/L    Potassium Level 4.8 3.5 - 5.1 mmol/L    Chloride 110 (H) 98 - 107 mmol/L    Carbon Dioxide 16 (L) 23 - 31 mmol/L    Glucose Level 145 (H) 82 - 115 mg/dL    Blood Urea Nitrogen 30.3 (H) 9.8 - 20.1 mg/dL    Creatinine 1.48 (H) 0.55 - 1.02 mg/dL    Calcium Level Total 8.4 8.4 - 10.2 mg/dL    Protein Total 5.1 (L) 5.8 - 7.6 gm/dL    Albumin Level 2.6 (L) 3.4 - 4.8 gm/dL    Globulin 2.5 2.4 - 3.5 gm/dL    Albumin/Globulin Ratio 1.0 (L) 1.1 - 2.0 ratio    Bilirubin Total 0.7 <=1.5 mg/dL    Alkaline Phosphatase 52  40 - 150 unit/L    Alanine Aminotransferase 15 0 - 55 unit/L    Aspartate Aminotransferase 30 5 - 34 unit/L    eGFR 35 mls/min/1.73/m2   Lactic Acid, Plasma    Collection Time: 09/19/22  5:28 AM   Result Value Ref Range    Lactic Acid Level 3.3 (H) 0.5 - 2.2 mmol/L   POCT glucose    Collection Time: 09/19/22  5:56 AM   Result Value Ref Range    POCT Glucose 144 (H) 70 - 110 mg/dL   Lactic Acid, Plasma    Collection Time: 09/19/22  9:12 AM   Result Value Ref Range    Lactic Acid Level 3.2 (H) 0.5 - 2.2 mmol/L   POCT glucose    Collection Time: 09/19/22 10:40 AM   Result Value Ref Range    POCT Glucose 165 (H) 70 - 110 mg/dL   Lactic Acid, Plasma    Collection Time: 09/19/22  1:10 PM   Result Value Ref Range    Lactic Acid Level 3.7 (HH) 0.5 - 2.2 mmol/L   POCT glucose    Collection Time: 09/19/22  1:26 PM   Result Value Ref Range    POCT Glucose 183 (H) 70 - 110 mg/dL       Significant Imaging:  Imaging Results              CT Cervical Spine Without Contrast (Final result)  Result time 09/18/22 12:55:27      Final result by Amando Reyes MD (09/18/22 12:55:27)                   Impression:      1.  Asymmetry in the interval between dens and lateral masses of C1 appears positional with unremarkable alignment of lateral masses of C1 and C2.    2.  Small old avulsed fragment from right lateral mass of C1.    No definite acute fracture or malalignment identified      Electronically signed by: Amando Reyes  Date:    09/18/2022  Time:    12:55               Narrative:    EXAMINATION:  CT CERVICAL SPINE WITHOUT CONTRAST    CLINICAL HISTORY:  Trauma.    TECHNIQUE:  Multidetector axial images were performed of the cervical spine without and.  Images were reconstructed.    Automated exposure control was utilized to minimize radiation dose.  DLP 1960.    COMPARISON:  None available.    FINDINGS:  There is decrease interval on the right between the dens and the right lateral mass of C1 which may be positional without loss of  alignment of the lateral masses of C1 and C2.  There is small corticated ossification adjacent to the right lateral mass of C1 on image 20 series 9 which is not convinced to be acute.   No acute appearing fracture or malalignment identified.  There is grade 1 anterolisthesis of C4 on C5.  Cervical vertebrae is stature preserved.  There are multilevel degenerative changes which cause ventral impression upon the thecal sac and narrowings of the neural foramen.  There is no prevertebral soft tissue prominence.    This study does not exclude the possibility of intrathecal soft tissue, ligamentous or vascular injury.                                       CT Head Without Contrast (Final result)  Result time 09/18/22 12:47:23      Final result by Amando Reyes MD (09/18/22 12:47:23)                   Impression:      No acute intracranial findings identified.      Electronically signed by: Amando Reyes  Date:    09/18/2022  Time:    12:47               Narrative:    EXAMINATION:  CT HEAD WITHOUT CONTRAST    CLINICAL HISTORY:  Trauma;    TECHNIQUE:  Sequential axial images were performed of the brain without contrast.    Dose product length of 1960 mGycm. Automated exposure control was utilized to minimize radiation dose.    COMPARISON:  None available.    FINDINGS:  There is no intracranial mass effect, midline shift, hydrocephalus or hemorrhage. There is no sulcal effacement or low attenuation changes to suggest recent large vessel territory infarction.  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  Chronic appearing periventricular and subcortical white matter low attenuation changes are present and are consistent with chronic microangiopathic ischemia. The ventricular system and sulcal markings prominence is consistent with atrophy. There is no acute extra axial fluid collection.  There is no acute depressed skull fracture.  Visualized paranasal sinuses are clear without mucosal thickening, polypoidal abnormality or air-fluid  levels. Mastoid air cells aeration is optimal.                                       CT Chest Abdomen Pelvis With Contrast (xpd) (Final result)  Result time 09/18/22 13:05:02      Final result by Ragini Zamorano MD (09/18/22 13:05:02)                   Impression:      1. Left basilar consolidative and ground-glass airspace opacities, concerning for infection.  Recommend follow-up after treatment to ensure resolution.  2. Heterogeneous enhancement of the spleen is likely perfusional.  3. Large left hiatal hernia.      Electronically signed by: Ragini Zamorano  Date:    09/18/2022  Time:    13:05               Narrative:    EXAMINATION:  CT CHEST ABDOMEN PELVIS WITH CONTRAST (XPD)    CLINICAL HISTORY:  Polytrauma, blunt;    TECHNIQUE:  CT of the chest, abdomen and pelvis WITH intravenous contrast. The chest, abdomen and pelvis were scanned utilizing a multidetector helical scanner from the lung apex to the lesser trochanter after the administration of intravenous contrast.    Coronal and sagittal reconstructions were obtained from the axial data set.    Automatic exposure control was utilized to limit radiation dose.    Radiation Dose:    Total DLP: 609 mGy*cm    COMPARISON:  None    FINDINGS:  LINES/TUBES: None.    AIRWAYS: Clear centrally.    LUNGS: There are consolidative and ground-glass airspace opacities in the left lung base.    PLEURA: No pleural effusions. No pneumothoraces.    HEART AND MEDIASTINUM: The heart is normal in size.  There is no pericardial effusion.  There is no evidence of a thoracic aortic injury.    SOFT TISSUES: Normal.    THORACIC BONES: No acute bony pathology.    ABDOMEN/PELVIS:    Evaluation is limited by motion artifact.    HEPATOBILIARY: The liver is slightly nodular in contour.  No evidence of acute injury.    SPLEEN: Heterogeneous enhancement in the spleen may be perfusional.    PANCREAS: Unremarkable.    ADRENALS: 1.5 cm right adrenal myelolipoma.    KIDNEYS/URETERS: No  evidence of acute injury.    PELVIC ORGANS/BLADDER: The uterus has been surgically resected.  The bladder is unremarkable.    PERITONEUM/RETROPERITONEUM: No free intraperitoneal air. No free fluid.    LYMPH NODES: No lymphadenopathy.    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    GI TRACT: No bowel obstruction.  Large hiatal hernia with nearly all the stomach in the left hemithorax.    ABDOMINOPELVIC BONES AND SOFT TISSUE: Soft tissues within normal limits. No acute bony pathology.  Severe degenerative changes of the right hip.                                       X-Ray Pelvis Routine AP (Final result)  Result time 09/18/22 12:42:08      Final result by Ragini Zamorano MD (09/18/22 12:42:08)                   Impression:      No definite displaced fracture identified.      Electronically signed by: Ragini Zamorano  Date:    09/18/2022  Time:    12:42               Narrative:    EXAMINATION:  XR PELVIS ROUTINE AP    CLINICAL HISTORY:  r/o bleeding or hemorrhage;    COMPARISON:  None.    FINDINGS:  Evaluation is limited by overlying external line.  There is no definite displaced fracture identified.  There are severe degenerative changes of the hips, right greater left, with joint space loss and osteophyte formation.  The soft tissues are unremarkable.                                       X-Ray Chest 1 View (Final result)  Result time 09/18/22 12:40:52      Final result by Ragini Zamorano MD (09/18/22 12:40:52)                   Impression:      Moderate left pleural effusion with basilar airspace opacities.      Electronically signed by: Ragini Zamorano  Date:    09/18/2022  Time:    12:40               Narrative:    EXAMINATION:  XR CHEST 1 VIEW    CLINICAL HISTORY:  r/o bleeding or hemorrhage;    TECHNIQUE:  AP chest    COMPARISON:  None.    FINDINGS:  The heart is enlarged.  There is a moderate left pleural effusion with overlying basilar airspace  opacities.  The right lung is clear.  There is no definite visible pneumothorax.  There is no definite displaced fracture identified.                                      EKG:        Telemetry:        Physical Exam  Constitutional:       Appearance: She is ill-appearing.   HENT:      Mouth/Throat:      Mouth: Mucous membranes are dry.   Cardiovascular:      Rate and Rhythm: Normal rate. Rhythm irregular.      Heart sounds: Murmur heard.   Pulmonary:      Breath sounds: Wheezing and rhonchi present.      Comments: O2 via oxymizer  Abdominal:      Palpations: Abdomen is soft.   Skin:     General: Skin is warm.      Capillary Refill: Capillary refill takes less than 2 seconds.   Neurological:      Mental Status: Mental status is at baseline.       Home Medications:   No current facility-administered medications on file prior to encounter.     Current Outpatient Medications on File Prior to Encounter   Medication Sig Dispense Refill    amlodipine-olmesartan (BECKIE) 5-20 mg per tablet Take 1 tablet by mouth once daily.      cloNIDine (CATAPRES) 0.1 MG tablet Take 0.1 mg by mouth 2 (two) times daily.      donepeziL (ARICEPT) 10 MG tablet Take 10 mg by mouth every evening.      furosemide (LASIX) 20 MG tablet Take by mouth.      lansoprazole (PREVACID) 30 MG capsule Take 30 mg by mouth once daily.      levothyroxine (SYNTHROID) 75 MCG tablet Take 75 mcg by mouth once daily.      memantine (NAMENDA) 10 MG Tab Take 10 mg by mouth 2 (two) times daily.      mirtazapine (REMERON) 15 MG tablet Take 7.5 mg by mouth once daily.      montelukast (SINGULAIR) 10 mg tablet Take 10 mg by mouth every evening.      NINJACOF 12.5-12.5 mg/5 mL Liqd SMARTSI Milliliter(s) By Mouth Every 6 Hours PRN      potassium chloride SA (K-DUR,KLOR-CON) 20 MEQ tablet Take 20 mEq by mouth once daily.         Current Inpatient Medications:    Current Facility-Administered Medications:     0.9%  NaCl infusion, , Intravenous, Continuous, Patt Lauren,  MIRANDA, Last Rate: 75 mL/hr at 09/18/22 1530, New Bag at 09/18/22 1530    acetaminophen tablet 650 mg, 650 mg, Oral, Q4H, Good Johnston MD, 650 mg at 09/19/22 0914    acetaminophen tablet 650 mg, 650 mg, Oral, Q4H PRN, Patt Lauren PA-C    albuterol-ipratropium 2.5 mg-0.5 mg/3 mL nebulizer solution 3 mL, 3 mL, Nebulization, Q6H PRN, Jelani Tubbs MD, 3 mL at 09/19/22 1313    benzonatate capsule 200 mg, 200 mg, Oral, TID PRN, Jelani Tubbs MD    calcium carbonate 200 mg calcium (500 mg) chewable tablet 1,000 mg, 1,000 mg, Oral, TID PRN, Good Johnston MD    dextrose 10 % infusion, 12.5 g, Intravenous, PRN, Patt Lauren PA-C    dextrose 10% bolus 250 mL, 25 g, Intravenous, PRN, Patt Lauren PA-C    docusate sodium capsule 100 mg, 100 mg, Oral, BID, Good Johnston MD, 100 mg at 09/19/22 0914    donepeziL tablet 10 mg, 10 mg, Oral, QHS, Jelani Tubbs MD    enoxaparin injection 30 mg, 30 mg, Subcutaneous, Daily, Patt Lauren PA-C, 30 mg at 09/18/22 1700    glucagon (human recombinant) injection 1 mg, 1 mg, Intramuscular, PRN, Patt Lauren PA-C    glucose chewable tablet 16 g, 16 g, Oral, PRN, Patt Lauren PA-C    glucose chewable tablet 24 g, 24 g, Oral, PRN, Patt Lauren PA-C    insulin aspart U-100 injection 1-10 Units, 1-10 Units, Subcutaneous, QID (AC + HS) PRN, Patt Lauren PA-C, 1 Units at 09/1937    [START ON 9/20/2022] levothyroxine tablet 75 mcg, 75 mcg, Oral, Daily, Jelani Tubbs MD    magnesium hydroxide 400 mg/5 ml suspension 2,400 mg, 30 mL, Oral, Daily PRN, Good Johnston MD    melatonin tablet 6 mg, 6 mg, Oral, Nightly PRN, Good Johnston MD    memantine tablet 10 mg, 10 mg, Oral, BID, Jelani Tubbs MD    mirtazapine tablet 7.5 mg, 7.5 mg, Oral, QHS, Caridad Roa MD, 7.5 mg at 09/19/22 0009    montelukast tablet 10 mg, 10 mg, Oral, QHS, Jelani Tubbs MD    ondansetron injection 4 mg, 4 mg,  Intravenous, Q6H PRN, Patt Lauren PA-C    oxyCODONE immediate release tablet 10 mg, 10 mg, Oral, Q4H PRN, Good Johnston MD    oxyCODONE immediate release tablet 5 mg, 5 mg, Oral, Q4H PRN, Good Johnston MD    [START ON 9/20/2022] pantoprazole EC tablet 40 mg, 40 mg, Oral, Daily, Jelani Tubbs MD    piperacillin-tazobactam (ZOSYN) 4.5 g in dextrose 5 % in water (D5W) 5 % 100 mL IVPB (MB+), 4.5 g, Intravenous, Q8H, Karl Rushing MD, Stopped at 09/19/22 0942    sodium zirconium cyclosilicate packet 10 g, 10 g, Oral, TID, Patt Lauren PA-C, 10 g at 09/18/22 1500    Pharmacy to dose Vancomycin consult, , , Once **AND** vancomycin - pharmacy to dose, , Intravenous, pharmacy to manage frequency, Jelani Tubbs MD         VTE Risk Mitigation (From admission, onward)           Ordered     enoxaparin injection 30 mg  Daily         09/18/22 1417     Reason for No Pharmacological VTE Prophylaxis  Once        Question:  Reasons:  Answer:  Risk of Bleeding    09/18/22 1239     IP VTE HIGH RISK PATIENT  Once         09/18/22 1239     Place sequential compression device  Until discontinued         09/18/22 1239                    Assessment:   Bacteremia  --Blood cultures x 1 + GPC, probable staph  Lactic Acidosis  --5.4->4.2->6.9->3.3->3.2->3.7  Pneumonia  Abnormal EKG  --SR with PACS and RBBB  Dementia  COPD  HTN  VHD--2+MR  Diastolic dysfunction  --grade I  Hiatal hernia    Plan:   ABX per primary  Persistent lactic acidosis. Consider additional IVF boluses  No evidence of Afib noted. Monitor tele.  Obtain echo for comparison  Will sign off if echo shows no evidence of vegetation          Thank you for your consult.     Beti French, MARISSA  Cardiology  [facility] - 4th Floor Medical Telemetry  09/19/2022 2:11 PM

## 2022-09-19 NOTE — PROGRESS NOTES
Pharmacokinetic Initial Assessment: IV Vancomycin    Assessment/Plan:    Initiate intravenous vancomycin with loading dose of 1250 mg once with subsequent doses when random concentrations are less than 20 mcg/mL  Desired empiric serum trough concentration is 15 to 20 mcg/mL  Draw vancomycin random level on 9/20 at 0500.  Pharmacy will continue to follow and monitor vancomycin.      Please contact pharmacy at extension [phone] with any questions regarding this assessment.     Thank you for the consult,   Pankaj Linshirley       Patient brief summary:  Mena Conti is a 87 y.o. female initiated on antimicrobial therapy with IV Vancomycin for treatment of suspected bacteremia    Drug Allergies:   Review of patient's allergies indicates:  No Known Allergies    Actual Body Weight:   71.5 kg    Renal Function:   Estimated Creatinine Clearance: 28 mL/min (A) (based on SCr of 1.48 mg/dL (H)).,     Dialysis Method (if applicable):  N/A    CBC (last 72 hours):  Recent Labs   Lab Result Units 09/18/22  1211 09/19/22  0429   WBC x10(3)/mcL 5.3 5.6   Hgb gm/dL 11.7* 12.6   Hct % 40.3 41.1   Platelet x10(3)/mcL 154 136   Monocyte Man % 14 5       Metabolic Panel (last 72 hours):  Recent Labs   Lab Result Units 09/18/22  1211 09/19/22  0413 09/19/22  0528   Sodium Level mmol/L 140  --  137   Potassium Level mmol/L 5.4*  --  4.8   Chloride mmol/L 108*  --  110*   Carbon Dioxide mmol/L 18*  --  16*   Glucose Level mg/dL 246*  --  145*   Glucose, UA mg/dL  --  Negative  --    Blood Urea Nitrogen mg/dL 26.3*  --  30.3*   Creatinine mg/dL 1.78*  --  1.48*   Albumin Level gm/dL 2.8*  --  2.6*   Bilirubin Total mg/dL 0.7  --  0.7   Alkaline Phosphatase unit/L 52  --  52   Aspartate Aminotransferase unit/L 24  --  30   Alanine Aminotransferase unit/L 12  --  15       Drug levels (last 3 results):  No results for input(s): VANCOMYCINRA, VANCORANDOM, VANCOMYCINPE, VANCOPEAK, VANCOMYCINTR, VANCOTROUGH in the last 72 hours.    Microbiologic  Results:  Microbiology Results (last 7 days)       Procedure Component Value Units Date/Time    Blood culture #2 **CANNOT BE ORDERED STAT** [[id number]]  (Abnormal) Collected: 09/18/22 1446    Order Status: Completed Specimen: Blood Updated: 09/19/22 1341     GRAM STAIN Gram Positive Cocci, probable Staphylococcus      Seen in gram stain of broth only      1 of 1 Aerobic bottle positive    Blood Culture #1 **CANNOT BE ORDERED STAT** [[id number]] Collected: 09/18/22 1446    Order Status: Resulted Specimen: Blood Updated: 09/18/22 1502

## 2022-09-20 LAB
ABS NEUT (OLG): 8.2 X10(3)/MCL (ref 2.1–9.2)
ANION GAP SERPL CALC-SCNC: 11 MEQ/L
BUN SERPL-MCNC: 31.5 MG/DL (ref 9.8–20.1)
BURR CELLS (OLG): ABNORMAL
CALCIUM SERPL-MCNC: 7.8 MG/DL (ref 8.4–10.2)
CHLORIDE SERPL-SCNC: 107 MMOL/L (ref 98–107)
CO2 SERPL-SCNC: 19 MMOL/L (ref 23–31)
CREAT SERPL-MCNC: 1.3 MG/DL (ref 0.55–1.02)
CREAT/UREA NIT SERPL: 24
ERYTHROCYTE [DISTWIDTH] IN BLOOD BY AUTOMATED COUNT: 14.1 % (ref 11.5–17)
GFR SERPLBLD CREATININE-BSD FMLA CKD-EPI: 40 MLS/MIN/1.73/M2
GLUCOSE SERPL-MCNC: 127 MG/DL (ref 82–115)
HCT VFR BLD AUTO: 40 % (ref 37–47)
HGB BLD-MCNC: 12.5 GM/DL (ref 12–16)
IMM GRANULOCYTES # BLD AUTO: 0.37 X10(3)/MCL (ref 0–0.04)
IMM GRANULOCYTES NFR BLD AUTO: 3.8 %
INSTRUMENT WBC (OLG): 10 X10(3)/MCL
LACTATE SERPL-SCNC: 1.8 MMOL/L (ref 0.5–2.2)
LACTATE SERPL-SCNC: 2.3 MMOL/L (ref 0.5–2.2)
LACTATE SERPL-SCNC: 2.5 MMOL/L (ref 0.5–2.2)
LACTATE SERPL-SCNC: 2.7 MMOL/L (ref 0.5–2.2)
LYMPHOCYTES NFR BLD MANUAL: 1 X10(3)/MCL
LYMPHOCYTES NFR BLD MANUAL: 10 %
MCH RBC QN AUTO: 29.1 PG (ref 27–31)
MCHC RBC AUTO-ENTMCNC: 31.3 MG/DL (ref 33–36)
MCV RBC AUTO: 93.2 FL (ref 80–94)
METAMYELOCYTES NFR BLD MANUAL: 3 %
MONOCYTES NFR BLD MANUAL: 0.8 X10(3)/MCL (ref 0.1–1.3)
MONOCYTES NFR BLD MANUAL: 8 %
NEUTROPHILS NFR BLD MANUAL: 79 %
NRBC BLD AUTO-RTO: 0 %
PLATELET # BLD AUTO: 133 X10(3)/MCL (ref 130–400)
PLATELET # BLD EST: NORMAL 10*3/UL
PMV BLD AUTO: 11.3 FL (ref 7.4–10.4)
POCT GLUCOSE: 121 MG/DL (ref 70–110)
POCT GLUCOSE: 137 MG/DL (ref 70–110)
POCT GLUCOSE: 183 MG/DL (ref 70–110)
POCT GLUCOSE: 235 MG/DL (ref 70–110)
POIKILOCYTOSIS BLD QL SMEAR: ABNORMAL
POTASSIUM SERPL-SCNC: 4.2 MMOL/L (ref 3.5–5.1)
RBC # BLD AUTO: 4.29 X10(6)/MCL (ref 4.2–5.4)
RBC MORPH BLD: ABNORMAL
SODIUM SERPL-SCNC: 137 MMOL/L (ref 136–145)
VANCOMYCIN SERPL-MCNC: 11.3 UG/ML (ref 15–20)
WBC # SPEC AUTO: 9.8 X10(3)/MCL (ref 4.5–11.5)

## 2022-09-20 PROCEDURE — 25000003 PHARM REV CODE 250: Performed by: INTERNAL MEDICINE

## 2022-09-20 PROCEDURE — 27000221 HC OXYGEN, UP TO 24 HOURS

## 2022-09-20 PROCEDURE — 25000003 PHARM REV CODE 250: Performed by: NURSE PRACTITIONER

## 2022-09-20 PROCEDURE — 25000003 PHARM REV CODE 250: Performed by: EMERGENCY MEDICINE

## 2022-09-20 PROCEDURE — 94640 AIRWAY INHALATION TREATMENT: CPT

## 2022-09-20 PROCEDURE — 21400001 HC TELEMETRY ROOM

## 2022-09-20 PROCEDURE — 25000242 PHARM REV CODE 250 ALT 637 W/ HCPCS: Performed by: INTERNAL MEDICINE

## 2022-09-20 PROCEDURE — 25000003 PHARM REV CODE 250: Performed by: PHYSICIAN ASSISTANT

## 2022-09-20 PROCEDURE — 83605 ASSAY OF LACTIC ACID: CPT | Performed by: INTERNAL MEDICINE

## 2022-09-20 PROCEDURE — 80202 ASSAY OF VANCOMYCIN: CPT | Performed by: INTERNAL MEDICINE

## 2022-09-20 PROCEDURE — 85025 COMPLETE CBC W/AUTO DIFF WBC: CPT | Performed by: INTERNAL MEDICINE

## 2022-09-20 PROCEDURE — 36415 COLL VENOUS BLD VENIPUNCTURE: CPT | Performed by: INTERNAL MEDICINE

## 2022-09-20 PROCEDURE — 63600175 PHARM REV CODE 636 W HCPCS: Performed by: PHYSICIAN ASSISTANT

## 2022-09-20 PROCEDURE — 25000003 PHARM REV CODE 250

## 2022-09-20 PROCEDURE — 63600175 PHARM REV CODE 636 W HCPCS: Performed by: INTERNAL MEDICINE

## 2022-09-20 PROCEDURE — 80048 BASIC METABOLIC PNL TOTAL CA: CPT | Performed by: INTERNAL MEDICINE

## 2022-09-20 PROCEDURE — 94761 N-INVAS EAR/PLS OXIMETRY MLT: CPT

## 2022-09-20 RX ORDER — VANCOMYCIN HCL IN 5 % DEXTROSE 1G/250ML
1000 PLASTIC BAG, INJECTION (ML) INTRAVENOUS ONCE
Status: COMPLETED | OUTPATIENT
Start: 2022-09-20 | End: 2022-09-20

## 2022-09-20 RX ORDER — GUAIFENESIN 100 MG/5ML
200 SOLUTION ORAL EVERY 4 HOURS PRN
Status: DISCONTINUED | OUTPATIENT
Start: 2022-09-20 | End: 2022-09-23 | Stop reason: HOSPADM

## 2022-09-20 RX ORDER — IPRATROPIUM BROMIDE AND ALBUTEROL SULFATE 2.5; .5 MG/3ML; MG/3ML
3 SOLUTION RESPIRATORY (INHALATION) EVERY 4 HOURS PRN
Status: DISCONTINUED | OUTPATIENT
Start: 2022-09-21 | End: 2022-09-21

## 2022-09-20 RX ADMIN — PANTOPRAZOLE SODIUM 40 MG: 40 TABLET, DELAYED RELEASE ORAL at 08:09

## 2022-09-20 RX ADMIN — BENZONATATE 200 MG: 100 CAPSULE ORAL at 03:09

## 2022-09-20 RX ADMIN — PIPERACILLIN AND TAZOBACTAM 4.5 G: 4; .5 INJECTION, POWDER, LYOPHILIZED, FOR SOLUTION INTRAVENOUS; PARENTERAL at 05:09

## 2022-09-20 RX ADMIN — DOCUSATE SODIUM 100 MG: 100 CAPSULE, LIQUID FILLED ORAL at 08:09

## 2022-09-20 RX ADMIN — VANCOMYCIN HYDROCHLORIDE 1000 MG: 1 INJECTION, POWDER, LYOPHILIZED, FOR SOLUTION INTRAVENOUS at 11:09

## 2022-09-20 RX ADMIN — ENOXAPARIN SODIUM 30 MG: 30 INJECTION SUBCUTANEOUS at 06:09

## 2022-09-20 RX ADMIN — DONEPEZIL HYDROCHLORIDE 10 MG: 5 TABLET, FILM COATED ORAL at 10:09

## 2022-09-20 RX ADMIN — BENZONATATE 200 MG: 100 CAPSULE ORAL at 05:09

## 2022-09-20 RX ADMIN — GUAIFENESIN 200 MG: 200 SOLUTION ORAL at 10:09

## 2022-09-20 RX ADMIN — INSULIN ASPART 4 UNITS: 100 INJECTION, SOLUTION INTRAVENOUS; SUBCUTANEOUS at 03:09

## 2022-09-20 RX ADMIN — GUAIFENESIN 200 MG: 200 SOLUTION ORAL at 01:09

## 2022-09-20 RX ADMIN — SODIUM CHLORIDE: 9 INJECTION, SOLUTION INTRAVENOUS at 03:09

## 2022-09-20 RX ADMIN — MEMANTINE 10 MG: 5 TABLET ORAL at 08:09

## 2022-09-20 RX ADMIN — BENZONATATE 200 MG: 100 CAPSULE ORAL at 10:09

## 2022-09-20 RX ADMIN — ACETAMINOPHEN 650 MG: 325 TABLET ORAL at 11:09

## 2022-09-20 RX ADMIN — MONTELUKAST 10 MG: 10 TABLET, FILM COATED ORAL at 10:09

## 2022-09-20 RX ADMIN — PIPERACILLIN AND TAZOBACTAM 4.5 G: 4; .5 INJECTION, POWDER, LYOPHILIZED, FOR SOLUTION INTRAVENOUS; PARENTERAL at 11:09

## 2022-09-20 RX ADMIN — PIPERACILLIN AND TAZOBACTAM 4.5 G: 4; .5 INJECTION, POWDER, LYOPHILIZED, FOR SOLUTION INTRAVENOUS; PARENTERAL at 10:09

## 2022-09-20 RX ADMIN — IPRATROPIUM BROMIDE AND ALBUTEROL SULFATE 3 ML: 2.5; .5 SOLUTION RESPIRATORY (INHALATION) at 02:09

## 2022-09-20 RX ADMIN — DOCUSATE SODIUM 100 MG: 100 CAPSULE, LIQUID FILLED ORAL at 10:09

## 2022-09-20 RX ADMIN — MEMANTINE 10 MG: 5 TABLET ORAL at 10:09

## 2022-09-20 RX ADMIN — ACETAMINOPHEN 650 MG: 325 TABLET ORAL at 03:09

## 2022-09-20 RX ADMIN — LEVOTHYROXINE SODIUM 75 MCG: 25 TABLET ORAL at 08:09

## 2022-09-20 RX ADMIN — IPRATROPIUM BROMIDE AND ALBUTEROL SULFATE 3 ML: 2.5; .5 SOLUTION RESPIRATORY (INHALATION) at 07:09

## 2022-09-20 RX ADMIN — ACETAMINOPHEN 650 MG: 325 TABLET ORAL at 10:09

## 2022-09-20 RX ADMIN — IPRATROPIUM BROMIDE AND ALBUTEROL SULFATE 3 ML: 2.5; .5 SOLUTION RESPIRATORY (INHALATION) at 06:09

## 2022-09-20 RX ADMIN — MIRTAZAPINE 7.5 MG: 7.5 TABLET ORAL at 10:09

## 2022-09-20 NOTE — PROGRESS NOTES
Pharmacokinetic Assessment Follow Up: IV Vancomycin    Vancomycin serum concentration assessment(s):    The random level was drawn correctly and can be used to guide therapy at this time. The measurement is below the desired definitive target range of 15 to 20 mcg/mL.    Vancomycin Regimen Plan:  Continue to pulse dose due to poor renal function  Give 1000 mg IV vancomycin now  Check random level on 9/21 @0500    Drug levels (last 3 results):  Recent Labs   Lab Result Units 09/20/22  0748   Vanc Lvl Random ug/ml 11.3*       Pharmacy will continue to follow and monitor vancomycin.    Please contact pharmacy at extension [phone] for questions regarding this assessment.    Thank you for the consult,   Jovi Mccullough       Patient brief summary:  Mena Conti is a 87 y.o. female initiated on antimicrobial therapy with IV Vancomycin for treatment of bacteremia      Drug Allergies:   Review of patient's allergies indicates:  No Known Allergies    Actual Body Weight:   71.5 kg    Renal Function:   Estimated Creatinine Clearance: 31.9 mL/min (A) (based on SCr of 1.3 mg/dL (H)).,     Dialysis Method (if applicable):  N/A    CBC (last 72 hours):  Recent Labs   Lab Result Units 09/18/22  1211 09/19/22  0429 09/20/22  0748   WBC x10(3)/mcL 5.3 5.6 9.8   Hgb gm/dL 11.7* 12.6 12.5   Hct % 40.3 41.1 40.0   Platelet x10(3)/mcL 154 136 133   Monocyte Man % 14 5  --        Metabolic Panel (last 72 hours):  Recent Labs   Lab Result Units 09/18/22  1211 09/19/22  0413 09/19/22  0528 09/20/22  0748   Sodium Level mmol/L 140  --  137 137   Potassium Level mmol/L 5.4*  --  4.8 4.2   Chloride mmol/L 108*  --  110* 107   Carbon Dioxide mmol/L 18*  --  16* 19*   Glucose Level mg/dL 246*  --  145* 127*   Glucose, UA mg/dL  --  Negative  --   --    Blood Urea Nitrogen mg/dL 26.3*  --  30.3* 31.5*   Creatinine mg/dL 1.78*  --  1.48* 1.30*   Albumin Level gm/dL 2.8*  --  2.6*  --    Bilirubin Total mg/dL 0.7  --  0.7  --    Alkaline Phosphatase  unit/L 52  --  52  --    Aspartate Aminotransferase unit/L 24  --  30  --    Alanine Aminotransferase unit/L 12  --  15  --        Vancomycin Administrations:  vancomycin given in the last 96 hours                     vancomycin 1.25 g in dextrose 5% 250 mL IVPB (ready to mix) (mg) 1,250 mg New Bag 09/19/22 1709                    Microbiologic Results:  Microbiology Results (last 7 days)       Procedure Component Value Units Date/Time    Blood Culture #1 **CANNOT BE ORDERED STAT** [[id number]]  (Normal) Collected: 09/18/22 1446    Order Status: Completed Specimen: Blood Updated: 09/19/22 1601     CULTURE, BLOOD ([facility]) No Growth At 24 Hours    Blood culture #2 **CANNOT BE ORDERED STAT** [[id number]]  (Abnormal) Collected: 09/18/22 1446    Order Status: Completed Specimen: Blood Updated: 09/19/22 1341     GRAM STAIN Gram Positive Cocci, probable Staphylococcus      Seen in gram stain of broth only      1 of 1 Aerobic bottle positive

## 2022-09-20 NOTE — PROGRESS NOTES
[facility] Hospital Medicine Progress Note        Chief Complaint: Inpatient follow-up on pneumonia    HPI:   Mena Conti is a 87 year old  female with a past medical history of hypertension, asthma, glaucoma, dementia, diabetes mellitus type 2 and valvular heart disease. The patient presented to [facility] on 9/18/2022 following a fall. Granddaughter who was at bedside states patient told her daughter with whom she lives that she was going to the bathroom. When daughter went to check on her she was found on the ground but awake and alert. Patient denies head injury or loss of consciousness.  Patient has been having a productive cough for the last week.  She presented to her PCP earlier in the week due to blood pressure being elevated at home with a systolic in the 190s and Lynsday was prescribed.  She began taking Lyndsay approximately 3 days ago.  Patient denies complaints of chest pain, fever, chills, sweats, shortness of breath.  She lives with daughter, ambulates with walker and needs assistance with activities of daily living.     Upon presentation to the ED, patient was febrile with a temperature 100.9° F, blood pressure 105/58 and SpO2 96% on room air.  Patient's blood pressure decreased to 84/53 which responded to IV fluid hydration.  She was placed on supplemental oxygen via nasal cannula which has since been titrated up to 4 L. labs were notable for WBC 5.3, hemoglobin 11.7, hematocrit 40.3, .8, potassium 5.4, CO2 18, BUN/creatinine 26.3/1.78, glucose 246, lactic acid 5.4.  No prior labs for comparison.  Ethanol level less than 10.  Influenza A and B and SARS-CoV-2 PCR negative.  Chest x-ray with moderate left pleural effusion with basilar airspace opacities.  X-ray of the pelvis without fracture.  CT head without acute intracranial abnormalities.  CT of the cervical spine with asymmetry in the interval between the dens and lateral masses of the C1 appears  position with unremarkable alignment of the lateral masses of C1 and C2 and small old avulsed fragment from the right lateral mass of C1.  CT chest, abdomen and pelvis with left basilar consolidative and ground-glass airspace opacity concerning for infection, heterogeneous enhancement of the spleen likely perfusional and a large left hiatal hernia.     Interval Hx:   Patient is about to take a shower with no acute issues reported.  She is afebrile, on supplemental oxygen at 4 liters/minute via nasal cannula, and hemodynamically stable.  Her family requests nutritional supplements.    Objective/physical exam:  General:  Elderly  female in no acute distress  HENT: normocephalic, atraumatic, poor dentition  Eye: PERRL, EOMI, clear conjunctiva  Neck: full ROM, no thyromegaly, no JVD  Respiratory: diminished breath sounds bilaterally  Cardiovascular: regular rate and rhythm  Gastrointestinal: non-distended, positive bowel sounds, non-tender  Musculoskeletal: no gross deformity  Integumentary: warm, dry, intact, no rashes  Neurological: cranial nerves grossly intact, no focal neurological deficit  Psychiatric: cooperative, flat affect      VITAL SIGNS: 24 HRS MIN & MAX LAST   Temp  Min: 97.7 °F (36.5 °C)  Max: 98.9 °F (37.2 °C) 97.7 °F (36.5 °C)   BP  Min: 126/79  Max: 199/89 138/73   Pulse  Min: 70  Max: 88  70   Resp  Min: 18  Max: 29 18   SpO2  Min: 91 %  Max: 97 % 95 %         Recent Labs   Lab 09/18/22  1211 09/19/22  0429 09/20/22  0748   WBC 5.3 5.6 9.8   RBC 4.00* 4.31 4.29   HGB 11.7* 12.6 12.5   HCT 40.3 41.1 40.0   .8* 95.4* 93.2   MCH 29.3 29.2 29.1   MCHC 29.0* 30.7* 31.3*   RDW 13.7 14.0 14.1    136 133   MPV 10.1 11.6* 11.3*       Recent Labs   Lab 09/18/22  1211 09/19/22  0528 09/20/22  0748    137 137   K 5.4* 4.8 4.2   CO2 18* 16* 19*   BUN 26.3* 30.3* 31.5*   CREATININE 1.78* 1.48* 1.30*   CALCIUM 9.0 8.4 7.8*   ALBUMIN 2.8* 2.6*  --    ALKPHOS 52 52  --    ALT 12 15   --    AST 24 30  --    BILITOT 0.7 0.7  --           Microbiology Results (last 7 days)       Procedure Component Value Units Date/Time    Blood culture #2 **CANNOT BE ORDERED STAT** [[id number]]  (Abnormal) Collected: 09/18/22 1446    Order Status: Completed Specimen: Blood Updated: 09/20/22 1207     CULTURE, BLOOD ([facility]) Identification and Susceptibility To Follow      Gram-positive coccus probable staph     GRAM STAIN Gram Positive Cocci, probable Staphylococcus      Seen in gram stain of broth only      1 of 1 Aerobic bottle positive    Blood Culture #1 **CANNOT BE ORDERED STAT** [[id number]]  (Normal) Collected: 09/18/22 1446    Order Status: Completed Specimen: Blood Updated: 09/19/22 1601     CULTURE, BLOOD ([facility]) No Growth At 24 Hours             See below for Radiology    Scheduled Med:   acetaminophen  650 mg Oral Q4H    docusate sodium  100 mg Oral BID    donepeziL  10 mg Oral QHS    enoxaparin  30 mg Subcutaneous Daily    levothyroxine  75 mcg Oral Daily    memantine  10 mg Oral BID    mirtazapine  7.5 mg Oral QHS    montelukast  10 mg Oral QHS    pantoprazole  40 mg Oral Daily    piperacillin-tazobactam (ZOSYN) IVPB  4.5 g Intravenous Q8H    sodium zirconium cyclosilicate  10 g Oral TID    vancomycin (VANCOCIN) IVPB  1,000 mg Intravenous Once        Continuous Infusions:   sodium chloride 0.9% 75 mL/hr at 09/18/22 1530        PRN Meds:  acetaminophen, albuterol-ipratropium, benzonatate, calcium carbonate, dextrose 10 % in water (D10W), dextrose 10%, glucagon (human recombinant), glucose, glucose, guaiFENesin 100 mg/5 ml, insulin aspart U-100, magnesium hydroxide 400 mg/5 ml, melatonin, ondansetron, oxyCODONE, oxyCODONE, Pharmacy to dose Vancomycin consult **AND** vancomycin - pharmacy to dose       Assessment/Plan:  Staphylococcal sepsis  Left lower lobe bacterial pneumonia  Large left hiatal hernia  Acute hypoxic respiratory failure  Chronic obstructive pulmonary disease  Metabolic acidosis  Acute kidney  injury versus chronic kidney disease  Non-insulin-dependent type 2 diabetes it is mellitus   Essential hypertension  Glaucoma  Valvular heart disease   Grade 1 diastolic dysfunction  Alzheimer's dementia      Plan:   Continue empiric antibiotic therapy, supplemental oxygen, appropriate home medications, and other supportive care    Critical Care Diagnosis: Acute hypoxic respiratory failure requiring high flow supplemental oxygen; sepsis requiring broad-spectrum intravenous antibiotics  Critical Care Interventions: Hands-on evaluation, review of labs, radiographs, medical records, and discussion with the patient and medical staff in order to assess and manage the high probability of imminent or life-threatening deterioration of cardio-respiratory status requiring vasopressor support and/or intubation and mechanical ventilation.  Critical Care Time Spent: 35 minutes        VTE prophylaxis:  Lovenox    Patient condition:  Stable    Anticipated discharge and Disposition:     TBD    All diagnosis and differential diagnosis have been reviewed; assessment and plan has been documented; I have personally reviewed the labs and test results that are presently available; I have reviewed the patients medication list; I have reviewed the consulting providers response and recommendations. I have reviewed or attempted to review medical records based upon their availability    All of the patient's questions have been  addressed and answered. Patient's is agreeable to the above stated plan. I will continue to monitor closely and make adjustments to medical management as needed.  _____________________________________________________________________        Radiology:  Echo  · Normal systolic function.  · The estimated ejection fraction is 60%.  · Grade I left ventricular diastolic dysfunction.  · Aortic valve sclerosis without stenosis.  · Normal right ventricular size with normal right ventricular systolic   function.  · Mild mitral  regurgitation.  · Mild tricuspid regurgitation.         Jelani Tubbs MD   09/20/2022

## 2022-09-21 LAB
ABS NEUT (OLG): 6.32 X10(3)/MCL (ref 2.1–9.2)
ANION GAP SERPL CALC-SCNC: 8 MEQ/L
BACTERIA BLD CULT: ABNORMAL
BNP BLD-MCNC: 166 PG/ML
BUN SERPL-MCNC: 27.5 MG/DL (ref 9.8–20.1)
CALCIUM SERPL-MCNC: 7.9 MG/DL (ref 8.4–10.2)
CHLORIDE SERPL-SCNC: 109 MMOL/L (ref 98–107)
CO2 SERPL-SCNC: 22 MMOL/L (ref 23–31)
CORRECTED TEMPERATURE (PCO2): 39 MMHG (ref 35–45)
CORRECTED TEMPERATURE (PH): 7.4 (ref 7.35–7.45)
CORRECTED TEMPERATURE (PO2): 59 MMHG (ref 80–100)
CREAT SERPL-MCNC: 1.01 MG/DL (ref 0.55–1.02)
CREAT/UREA NIT SERPL: 27
ERYTHROCYTE [DISTWIDTH] IN BLOOD BY AUTOMATED COUNT: 14.1 % (ref 11.5–17)
GFR SERPLBLD CREATININE-BSD FMLA CKD-EPI: 55 MLS/MIN/1.73/M2
GLUCOSE SERPL-MCNC: 146 MG/DL (ref 82–115)
GRAM STN SPEC: ABNORMAL
HCO3 UR-SCNC: 24.2 MMOL/L (ref 22–26)
HCT VFR BLD AUTO: 40 % (ref 37–47)
HGB BLD-MCNC: 11.5 G/DL (ref 12–16)
HGB BLD-MCNC: 12.3 GM/DL (ref 12–16)
IMM GRANULOCYTES # BLD AUTO: 0.15 X10(3)/MCL (ref 0–0.04)
IMM GRANULOCYTES NFR BLD AUTO: 1.9 %
INSTRUMENT WBC (OLG): 8 X10(3)/MCL
LYMPHOCYTES NFR BLD MANUAL: 0.96 X10(3)/MCL
LYMPHOCYTES NFR BLD MANUAL: 12 %
MCH RBC QN AUTO: 29 PG (ref 27–31)
MCHC RBC AUTO-ENTMCNC: 30.8 MG/DL (ref 33–36)
MCV RBC AUTO: 94.3 FL (ref 80–94)
MONOCYTES NFR BLD MANUAL: 0.72 X10(3)/MCL (ref 0.1–1.3)
MONOCYTES NFR BLD MANUAL: 9 %
MRSA PCR SCRN (OHS): DETECTED
NEUTROPHILS NFR BLD MANUAL: 79 %
NRBC BLD AUTO-RTO: 0 %
PCO2 BLDA: 39 MMHG (ref 35–45)
PH SMN: 7.4 [PH] (ref 7.35–7.45)
PLATELET # BLD AUTO: 154 X10(3)/MCL (ref 130–400)
PLATELET # BLD EST: NORMAL 10*3/UL
PMV BLD AUTO: 10.8 FL (ref 7.4–10.4)
PO2 BLDA: 59 MMHG (ref 80–100)
POC BASE DEFICIT: -0.5 MMOL/L (ref -2–2)
POC COHB: 1.6 %
POC IONIZED CALCIUM: 1.09 MMOL/L (ref 1.12–1.23)
POC METHB: 0.9 % (ref 0.4–1.5)
POC O2HB: 90.4 % (ref 94–97)
POC SATURATED O2: 90.2 %
POC TEMPERATURE: 37 °C
POCT GLUCOSE: 147 MG/DL (ref 70–110)
POCT GLUCOSE: 197 MG/DL (ref 70–110)
POCT GLUCOSE: 223 MG/DL (ref 70–110)
POTASSIUM BLD-SCNC: 3.8 MMOL/L (ref 3.5–5)
POTASSIUM SERPL-SCNC: 3.4 MMOL/L (ref 3.5–5.1)
RBC # BLD AUTO: 4.24 X10(6)/MCL (ref 4.2–5.4)
RBC MORPH BLD: NORMAL
SODIUM BLD-SCNC: 133 MMOL/L (ref 137–145)
SODIUM SERPL-SCNC: 139 MMOL/L (ref 136–145)
SPECIMEN SOURCE: ABNORMAL
VANCOMYCIN TROUGH SERPL-MCNC: 12.9 UG/ML (ref 15–20)
WBC # SPEC AUTO: 8 X10(3)/MCL (ref 4.5–11.5)

## 2022-09-21 PROCEDURE — 21400001 HC TELEMETRY ROOM

## 2022-09-21 PROCEDURE — 36415 COLL VENOUS BLD VENIPUNCTURE: CPT | Performed by: INTERNAL MEDICINE

## 2022-09-21 PROCEDURE — 87070 CULTURE OTHR SPECIMN AEROBIC: CPT | Performed by: INTERNAL MEDICINE

## 2022-09-21 PROCEDURE — 85025 COMPLETE CBC W/AUTO DIFF WBC: CPT | Performed by: INTERNAL MEDICINE

## 2022-09-21 PROCEDURE — 99900035 HC TECH TIME PER 15 MIN (STAT)

## 2022-09-21 PROCEDURE — 63600175 PHARM REV CODE 636 W HCPCS: Performed by: INTERNAL MEDICINE

## 2022-09-21 PROCEDURE — 87184 SC STD DISK METHOD PER PLATE: CPT | Performed by: INTERNAL MEDICINE

## 2022-09-21 PROCEDURE — 25000003 PHARM REV CODE 250: Performed by: INTERNAL MEDICINE

## 2022-09-21 PROCEDURE — 36600 WITHDRAWAL OF ARTERIAL BLOOD: CPT

## 2022-09-21 PROCEDURE — 87641 MR-STAPH DNA AMP PROBE: CPT | Performed by: INTERNAL MEDICINE

## 2022-09-21 PROCEDURE — 25000003 PHARM REV CODE 250

## 2022-09-21 PROCEDURE — 80048 BASIC METABOLIC PNL TOTAL CA: CPT | Performed by: INTERNAL MEDICINE

## 2022-09-21 PROCEDURE — 25000003 PHARM REV CODE 250: Performed by: NURSE PRACTITIONER

## 2022-09-21 PROCEDURE — 94761 N-INVAS EAR/PLS OXIMETRY MLT: CPT

## 2022-09-21 PROCEDURE — 25000242 PHARM REV CODE 250 ALT 637 W/ HCPCS: Performed by: INTERNAL MEDICINE

## 2022-09-21 PROCEDURE — 97162 PT EVAL MOD COMPLEX 30 MIN: CPT

## 2022-09-21 PROCEDURE — 87040 BLOOD CULTURE FOR BACTERIA: CPT | Performed by: INTERNAL MEDICINE

## 2022-09-21 PROCEDURE — 83880 ASSAY OF NATRIURETIC PEPTIDE: CPT | Performed by: NURSE PRACTITIONER

## 2022-09-21 PROCEDURE — 94640 AIRWAY INHALATION TREATMENT: CPT

## 2022-09-21 PROCEDURE — 82803 BLOOD GASES ANY COMBINATION: CPT

## 2022-09-21 PROCEDURE — 27000221 HC OXYGEN, UP TO 24 HOURS

## 2022-09-21 PROCEDURE — 25000003 PHARM REV CODE 250: Performed by: PHYSICIAN ASSISTANT

## 2022-09-21 PROCEDURE — 63600175 PHARM REV CODE 636 W HCPCS: Performed by: PHYSICIAN ASSISTANT

## 2022-09-21 PROCEDURE — 25000003 PHARM REV CODE 250: Performed by: EMERGENCY MEDICINE

## 2022-09-21 PROCEDURE — 80202 ASSAY OF VANCOMYCIN: CPT | Performed by: INTERNAL MEDICINE

## 2022-09-21 RX ORDER — IPRATROPIUM BROMIDE AND ALBUTEROL SULFATE 2.5; .5 MG/3ML; MG/3ML
3 SOLUTION RESPIRATORY (INHALATION)
Status: DISCONTINUED | OUTPATIENT
Start: 2022-09-21 | End: 2022-09-23 | Stop reason: HOSPADM

## 2022-09-21 RX ORDER — CLINDAMYCIN PHOSPHATE 600 MG/50ML
600 INJECTION, SOLUTION INTRAVENOUS
Status: DISCONTINUED | OUTPATIENT
Start: 2022-09-21 | End: 2022-09-23 | Stop reason: HOSPADM

## 2022-09-21 RX ORDER — ALPRAZOLAM 0.25 MG/1
0.25 TABLET ORAL 3 TIMES DAILY PRN
Status: DISCONTINUED | OUTPATIENT
Start: 2022-09-21 | End: 2022-09-23 | Stop reason: HOSPADM

## 2022-09-21 RX ORDER — IPRATROPIUM BROMIDE AND ALBUTEROL SULFATE 2.5; .5 MG/3ML; MG/3ML
3 SOLUTION RESPIRATORY (INHALATION) EVERY 4 HOURS PRN
Status: DISCONTINUED | OUTPATIENT
Start: 2022-09-22 | End: 2022-09-23 | Stop reason: HOSPADM

## 2022-09-21 RX ORDER — FUROSEMIDE 10 MG/ML
20 INJECTION INTRAMUSCULAR; INTRAVENOUS ONCE
Status: COMPLETED | OUTPATIENT
Start: 2022-09-21 | End: 2022-09-21

## 2022-09-21 RX ORDER — CODEINE PHOSPHATE AND GUAIFENESIN 10; 100 MG/5ML; MG/5ML
10 SOLUTION ORAL EVERY 6 HOURS
Status: DISCONTINUED | OUTPATIENT
Start: 2022-09-21 | End: 2022-09-23 | Stop reason: HOSPADM

## 2022-09-21 RX ADMIN — GUAIFENESIN 200 MG: 200 SOLUTION ORAL at 10:09

## 2022-09-21 RX ADMIN — BENZONATATE 200 MG: 100 CAPSULE ORAL at 08:09

## 2022-09-21 RX ADMIN — INSULIN ASPART 4 UNITS: 100 INJECTION, SOLUTION INTRAVENOUS; SUBCUTANEOUS at 12:09

## 2022-09-21 RX ADMIN — ACETAMINOPHEN 650 MG: 325 TABLET ORAL at 05:09

## 2022-09-21 RX ADMIN — MEMANTINE 10 MG: 5 TABLET ORAL at 08:09

## 2022-09-21 RX ADMIN — METHYLPREDNISOLONE SODIUM SUCCINATE 60 MG: 40 INJECTION, POWDER, FOR SOLUTION INTRAMUSCULAR; INTRAVENOUS at 10:09

## 2022-09-21 RX ADMIN — PIPERACILLIN AND TAZOBACTAM 4.5 G: 4; .5 INJECTION, POWDER, LYOPHILIZED, FOR SOLUTION INTRAVENOUS; PARENTERAL at 03:09

## 2022-09-21 RX ADMIN — IPRATROPIUM BROMIDE AND ALBUTEROL SULFATE 3 ML: 2.5; .5 SOLUTION RESPIRATORY (INHALATION) at 07:09

## 2022-09-21 RX ADMIN — DOCUSATE SODIUM 100 MG: 100 CAPSULE, LIQUID FILLED ORAL at 10:09

## 2022-09-21 RX ADMIN — IPRATROPIUM BROMIDE AND ALBUTEROL SULFATE 3 ML: 2.5; .5 SOLUTION RESPIRATORY (INHALATION) at 03:09

## 2022-09-21 RX ADMIN — ENOXAPARIN SODIUM 30 MG: 30 INJECTION SUBCUTANEOUS at 04:09

## 2022-09-21 RX ADMIN — SODIUM CHLORIDE: 9 INJECTION, SOLUTION INTRAVENOUS at 11:09

## 2022-09-21 RX ADMIN — GUAIFENESIN 200 MG: 200 SOLUTION ORAL at 03:09

## 2022-09-21 RX ADMIN — PIPERACILLIN AND TAZOBACTAM 4.5 G: 4; .5 INJECTION, POWDER, LYOPHILIZED, FOR SOLUTION INTRAVENOUS; PARENTERAL at 09:09

## 2022-09-21 RX ADMIN — SODIUM CHLORIDE: 9 INJECTION, SOLUTION INTRAVENOUS at 08:09

## 2022-09-21 RX ADMIN — MIRTAZAPINE 7.5 MG: 7.5 TABLET ORAL at 10:09

## 2022-09-21 RX ADMIN — CLINDAMYCIN IN 5 PERCENT DEXTROSE 600 MG: 12 INJECTION, SOLUTION INTRAVENOUS at 08:09

## 2022-09-21 RX ADMIN — PIPERACILLIN AND TAZOBACTAM 4.5 G: 4; .5 INJECTION, POWDER, LYOPHILIZED, FOR SOLUTION INTRAVENOUS; PARENTERAL at 12:09

## 2022-09-21 RX ADMIN — DOCUSATE SODIUM 100 MG: 100 CAPSULE, LIQUID FILLED ORAL at 08:09

## 2022-09-21 RX ADMIN — LEVOTHYROXINE SODIUM 75 MCG: 25 TABLET ORAL at 08:09

## 2022-09-21 RX ADMIN — PANTOPRAZOLE SODIUM 40 MG: 40 TABLET, DELAYED RELEASE ORAL at 08:09

## 2022-09-21 RX ADMIN — GUAIFENESIN AND CODEINE PHOSPHATE 10 ML: 100; 10 SOLUTION ORAL at 06:09

## 2022-09-21 RX ADMIN — VANCOMYCIN HYDROCHLORIDE 1250 MG: 1.25 INJECTION, POWDER, LYOPHILIZED, FOR SOLUTION INTRAVENOUS at 08:09

## 2022-09-21 RX ADMIN — ALPRAZOLAM 0.25 MG: 0.25 TABLET ORAL at 04:09

## 2022-09-21 RX ADMIN — POTASSIUM BICARBONATE 40 MEQ: 391 TABLET, EFFERVESCENT ORAL at 11:09

## 2022-09-21 RX ADMIN — ACETAMINOPHEN 650 MG: 325 TABLET ORAL at 10:09

## 2022-09-21 RX ADMIN — METHYLPREDNISOLONE SODIUM SUCCINATE 60 MG: 40 INJECTION, POWDER, FOR SOLUTION INTRAMUSCULAR; INTRAVENOUS at 01:09

## 2022-09-21 RX ADMIN — DONEPEZIL HYDROCHLORIDE 10 MG: 5 TABLET, FILM COATED ORAL at 10:09

## 2022-09-21 RX ADMIN — IPRATROPIUM BROMIDE AND ALBUTEROL SULFATE 3 ML: 2.5; .5 SOLUTION RESPIRATORY (INHALATION) at 11:09

## 2022-09-21 RX ADMIN — IPRATROPIUM BROMIDE AND ALBUTEROL SULFATE 3 ML: 2.5; .5 SOLUTION RESPIRATORY (INHALATION) at 12:09

## 2022-09-21 RX ADMIN — IPRATROPIUM BROMIDE AND ALBUTEROL SULFATE 3 ML: 2.5; .5 SOLUTION RESPIRATORY (INHALATION) at 08:09

## 2022-09-21 RX ADMIN — MEMANTINE 10 MG: 5 TABLET ORAL at 10:09

## 2022-09-21 RX ADMIN — CLINDAMYCIN IN 5 PERCENT DEXTROSE 600 MG: 12 INJECTION, SOLUTION INTRAVENOUS at 11:09

## 2022-09-21 RX ADMIN — ACETAMINOPHEN 650 MG: 325 TABLET ORAL at 03:09

## 2022-09-21 RX ADMIN — IPRATROPIUM BROMIDE AND ALBUTEROL SULFATE 3 ML: 2.5; .5 SOLUTION RESPIRATORY (INHALATION) at 01:09

## 2022-09-21 RX ADMIN — FUROSEMIDE 20 MG: 10 INJECTION, SOLUTION INTRAMUSCULAR; INTRAVENOUS at 02:09

## 2022-09-21 RX ADMIN — MONTELUKAST 10 MG: 10 TABLET, FILM COATED ORAL at 10:09

## 2022-09-21 NOTE — PLAN OF CARE
Problem: Physical Therapy  Goal: Physical Therapy Goal  Description: Goals to be met by: 10/21/22     Patient will increase functional independence with mobility by performin. Supine to sit with Stand-by Assistance  2. Sit to supine with Stand-by Assistance  3. Sit to stand transfer with Supervision  4. Gait  x 200 feet with Contact Guard Assistance using Rolling Walker.     Outcome: Ongoing, Progressing

## 2022-09-21 NOTE — PROGRESS NOTES
[facility] Hospital Medicine Progress Note        Chief Complaint: Inpatient follow-up on pneumonia     HPI:   Mena Conti is a 87 year old  female with a past medical history of hypertension, asthma, glaucoma, dementia, diabetes mellitus type 2 and valvular heart disease. The patient presented to [facility] on 9/18/2022 following a fall. Granddaughter who was at bedside states patient told her daughter with whom she lives that she was going to the bathroom. When daughter went to check on her she was found on the ground but awake and alert. Patient denies head injury or loss of consciousness.  Patient has been having a productive cough for the last week.  She presented to her PCP earlier in the week due to blood pressure being elevated at home with a systolic in the 190s and Lyndsay was prescribed.  She began taking Lyndsay approximately 3 days ago.  Patient denies complaints of chest pain, fever, chills, sweats, shortness of breath.  She lives with daughter, ambulates with walker and needs assistance with activities of daily living.     Upon presentation to the ED, patient was febrile with a temperature 100.9° F, blood pressure 105/58 and SpO2 96% on room air.  Patient's blood pressure decreased to 84/53 which responded to IV fluid hydration.  She was placed on supplemental oxygen via nasal cannula which has since been titrated up to 4 L. labs were notable for WBC 5.3, hemoglobin 11.7, hematocrit 40.3, .8, potassium 5.4, CO2 18, BUN/creatinine 26.3/1.78, glucose 246, lactic acid 5.4.  No prior labs for comparison.  Ethanol level less than 10.  Influenza A and B and SARS-CoV-2 PCR negative.  Chest x-ray with moderate left pleural effusion with basilar airspace opacities.  X-ray of the pelvis without fracture.  CT head without acute intracranial abnormalities.  CT of the cervical spine with asymmetry in the interval between the dens and lateral masses of the C1 appears  position with unremarkable alignment of the lateral masses of C1 and C2 and small old avulsed fragment from the right lateral mass of C1.  CT chest, abdomen and pelvis with left basilar consolidative and ground-glass airspace opacity concerning for infection, heterogeneous enhancement of the spleen likely perfusional and a large left hiatal hernia.       Patient currently being managed for left lower lobe pneumonia with hypoxic respiratory failure, Staph hominis bacteremia.  It has a history of COPD and asthma.      Today's information  Patient seen at bedside, family members at bedside   Remains on 4 L of oxygen.    Continue oxygen supplementation, incentive spirometry, IV Zosyn, repeat blood cultures x2 to document clearance.    Consult speech for evaluation, patient possibly aspirating   Sputum cultures  Cough meds as needed   Repeat chest x-ray shows worsening infection   Creatinine is back to normal.  Echo reviewed no vegetations  Potassium is low will replete   Blood cultures grew Staph hominis with sensitivities noted discontinue vancomycin and changed to clindamycin      Objective/physical exam:  General:  Elderly  female in no acute distress  HENT: normocephalic, atraumatic, poor dentition  Eye: PERRL, EOMI, clear conjunctiva  Neck: full ROM, no thyromegaly, no JVD  Respiratory: diminished breath sounds bilaterally  Cardiovascular: regular rate and rhythm  Gastrointestinal: non-distended, positive bowel sounds, non-tender  Musculoskeletal: no gross deformity  Integumentary: warm, dry, intact, no rashes  Neurological: cranial nerves grossly intact, no focal neurological deficit  Psychiatric: cooperative, flat affect      Assessment/Plan:  Staphylococcal hominis sepsis  Left lower lobe bacterial pneumonia  Acute hypoxic respiratory failure  Chronic obstructive pulmonary disease  Metabolic acidosis  Acute kidney injury versus chronic kidney disease  Non-insulin-dependent type 2 diabetes it is  mellitus   Essential hypertension  Glaucoma  Valvular heart disease   Grade 1 diastolic dysfunction  Alzheimer's dementia   Large left hiatal hernia   Low-grade fever       Plan  Remains on 4 L of oxygen.    Continue oxygen supplementation, incentive spirometry, IV Zosyn, repeat blood cultures x2 to document clearance.    Consult speech for evaluation, patient possibly aspirating   Sputum cultures  Cough meds as needed   Repeat chest x-ray shows worsening infection   Creatinine is back to normal.    Echo reviewed no vegetations  Potassium is low will replete   Blood cultures grew Staph hominis with sensitivities noted discontinue vancomycin and changed to clindamycin   Continue supportive care     Critical Care Diagnosis: Acute hypoxic respiratory failure requiring high flow supplemental oxygen; sepsis requiring broad-spectrum intravenous antibiotics  Critical Care Interventions: Hands-on evaluation, review of labs, radiographs, medical records, and discussion with the patient and medical staff in order to assess and manage the high probability of imminent or life-threatening deterioration of cardio-respiratory status requiring vasopressor support and/or intubation and mechanical ventilation.  Critical Care Time Spent: 35 minutes           VTE prophylaxis:  Lovenox     Patient condition:  Stable     Anticipated discharge and Disposition:     TBD      VITAL SIGNS: 24 HRS MIN & MAX LAST   Temp  Min: 97.4 °F (36.3 °C)  Max: 100.2 °F (37.9 °C) 97.9 °F (36.6 °C)   BP  Min: 122/74  Max: 158/90 (!) 152/89     Pulse  Min: 63  Max: 88  73   Resp  Min: 18  Max: 23 20   SpO2  Min: 93 %  Max: 99 % 95 %       Recent Labs   Lab 09/19/22  0429 09/20/22  0748 09/21/22  0649   WBC 5.6 9.8 8.0   RBC 4.31 4.29 4.24   HGB 12.6 12.5 12.3   HCT 41.1 40.0 40.0   MCV 95.4* 93.2 94.3*   MCH 29.2 29.1 29.0   MCHC 30.7* 31.3* 30.8*   RDW 14.0 14.1 14.1    133 154   MPV 11.6* 11.3* 10.8*       Recent Labs   Lab 09/18/22  1211  09/19/22  0528 09/20/22  0748 09/21/22  0649    137 137 139   K 5.4* 4.8 4.2 3.4*   CO2 18* 16* 19* 22*   BUN 26.3* 30.3* 31.5* 27.5*   CREATININE 1.78* 1.48* 1.30* 1.01   CALCIUM 9.0 8.4 7.8* 7.9*   ALBUMIN 2.8* 2.6*  --   --    ALKPHOS 52 52  --   --    ALT 12 15  --   --    AST 24 30  --   --    BILITOT 0.7 0.7  --   --           Microbiology Results (last 7 days)       Procedure Component Value Units Date/Time    Blood Culture [[id number]] Collected: 09/21/22 0938    Order Status: Resulted Specimen: Blood from Arm, Right Updated: 09/21/22 0945    Blood Culture [[id number]] Collected: 09/21/22 0938    Order Status: Resulted Specimen: Blood from Arm, Left Updated: 09/21/22 0945    Blood culture #2 **CANNOT BE ORDERED STAT** [[id number]]  (Abnormal)  (Susceptibility) Collected: 09/18/22 1446    Order Status: Completed Specimen: Blood Updated: 09/21/22 0906     CULTURE, BLOOD ([facility]) Staphylococcus hominis subsp. hominis     GRAM STAIN Gram Positive Cocci, probable Staphylococcus      Seen in gram stain of broth only      1 of 1 Aerobic bottle positive    Blood Culture #1 **CANNOT BE ORDERED STAT** [[id number]]  (Normal) Collected: 09/18/22 1446    Order Status: Completed Specimen: Blood Updated: 09/20/22 1601     CULTURE, BLOOD ([facility]) No Growth At 48 Hours             See below for Radiology    Scheduled Med:   acetaminophen  650 mg Oral Q4H    albuterol-ipratropium  3 mL Nebulization Q4H WAKE    clindamycin (CLEOCIN) IVPB  600 mg Intravenous Q8H    docusate sodium  100 mg Oral BID    donepeziL  10 mg Oral QHS    enoxaparin  30 mg Subcutaneous Daily    levothyroxine  75 mcg Oral Daily    memantine  10 mg Oral BID    mirtazapine  7.5 mg Oral QHS    montelukast  10 mg Oral QHS    pantoprazole  40 mg Oral Daily    piperacillin-tazobactam (ZOSYN) IVPB  4.5 g Intravenous Q8H    potassium bicarbonate  40 mEq Oral Q2H        Continuous Infusions:   sodium chloride 0.9% 75 mL/hr at 09/21/22 1127        PRN  Meds:  acetaminophen, benzonatate, calcium carbonate, dextrose 10 % in water (D10W), dextrose 10%, glucagon (human recombinant), glucose, glucose, guaiFENesin 100 mg/5 ml, insulin aspart U-100, magnesium hydroxide 400 mg/5 ml, melatonin, ondansetron, oxyCODONE, oxyCODONE         VTE prophylaxis:     Patient condition:  Stable/Fair/Guarded/ Serious/ Critical    Anticipated discharge and Disposition:         All diagnosis and differential diagnosis have been reviewed; assessment and plan has been documented; I have personally reviewed the labs and test results that are presently available; I have reviewed the patients medication list; I have reviewed the consulting providers response and recommendations. I have reviewed or attempted to review medical records based upon their availability    All of the patient's questions have been  addressed and answered. Patient's is agreeable to the above stated plan. I will continue to monitor closely and make adjustments to medical management as needed.  _____________________________________________________________________    Nutrition Status:    Radiology:  X-Ray Chest 1 View  Narrative: EXAMINATION:  XR CHEST 1 VIEW    CPT 93497    CLINICAL HISTORY:  sob;    COMPARISON:  September 18, 2022    FINDINGS:  Cardiomediastinal silhouette to be unchanged as compared with the previous exam there is some worsening consolidative changes in the left lung which might be related to pleural fluid and or represent an infiltrate/atelectasis    No other interval change  Impression: Worsening consolidative changes in the left lung which might be related to pleural fluid or represent an infiltrate/atelectasis    No other change    Electronically signed by: Josh White  Date:    09/21/2022  Time:    08:20      Primitivo Giles MD   09/21/2022

## 2022-09-21 NOTE — CONSULTS
Chief Complaint   Patient presents with    Fall    Hypotension    Altered Mental Status       HPI  This is a 87 y.o. female  old  female with a past medical history of hypertension, asthma, glaucoma, dementia, diabetes mellitus type 2 and valvular heart disease. The patient presented to [facility] on 9/18/2022 following a fall. Granddaughter who was at bedside states patient told her daughter with whom she lives that she was going to the bathroom. When daughter went to check on her she was found on the ground but awake and alert. Patient denies head injury or loss of consciousness.  Patient has been having a productive cough for the last week.  She presented to her PCP earlier in the week due to blood pressure being elevated at home with a systolic in the 190s and Lyndsay was prescribed.  She began taking Lyndsay approximately 3 days ago.  Patient denies complaints of chest pain, fever, chills, sweats, shortness of breath.  She lives with daughter, ambulates with walker and needs assistance with activities of daily living.     Upon presentation to the ED, patient was febrile with a temperature 100.9° F, blood pressure 105/58 and SpO2 96% on room air.  Patient's blood pressure decreased to 84/53 which responded to IV fluid hydration.  She was placed on supplemental oxygen via nasal cannula which has since been titrated up to 4 L. labs were notable for WBC 5.3, hemoglobin 11.7, hematocrit 40.3, .8, potassium 5.4, CO2 18, BUN/creatinine 26.3/1.78, glucose 246, lactic acid 5.4.  No prior labs for comparison.  Ethanol level less than 10.  Influenza A and B and SARS-CoV-2 PCR negative.  Chest x-ray with moderate left pleural effusion with basilar airspace opacities.  X-ray of the pelvis without fracture.  CT head without acute intracranial abnormalities.  CT of the cervical spine with asymmetry in the interval between the dens and lateral masses of the C1 appears position with unremarkable alignment of the  lateral masses of C1 and C2 and small old avulsed fragment from the right lateral mass of C1.  CT chest, abdomen and pelvis with left basilar consolidative and ground-glass airspace opacity concerning for infection, heterogeneous enhancement of the spleen likely perfusional and a large left hiatal hernia.        Patient was admitted and being managed for left lower lobe pneumonia with hypoxic respiratory failure, Staph hominis bacteremia.  She has no history of COPD and asthma.        Patient seen at bedside, family members at bedside   Remains on 4 L of oxygen.    Continue oxygen supplementation, incentive spirometry, IV Zosyn, repeat blood cultures x2 to document clearance.    Consult speech for evaluation, patient possibly aspirating   Sputum cultures  Cough meds as needed   Repeat chest x-ray shows worsening infection   Creatinine is back to normal.  Echo reviewed no vegetations  Potassium is low will replete   Blood cultures grew Staph hominis with sensitivities noted discontinue vancomycin and changed to clindamycin      With all above as noted and heated and reviewed at length, patient was seen at the beside the skull to by her very devoted daughter wants was on the commode.  She is definitely and some this has visited the that the patient denies but nothing that is a good component of dementia.  ABGs were done after the chest x-ray and the mass was see her for any assistance.    History reviewed. No pertinent past medical history.    History reviewed. No pertinent surgical history.    Social History     Social History Narrative    Not on file       Review of patient's allergies indicates:  No Known Allergies      Current Facility-Administered Medications:     acetaminophen tablet 650 mg, 650 mg, Oral, Q4H, Good Johnston MD, 650 mg at 09/21/22 0546    acetaminophen tablet 650 mg, 650 mg, Oral, Q4H PRN, Patt Lauren PA-C    albuterol-ipratropium 2.5 mg-0.5 mg/3 mL nebulizer solution 3 mL, 3 mL,  Nebulization, Q4H WAKE, Primitivo Giles MD, 3 mL at 09/21/22 1240    benzonatate capsule 200 mg, 200 mg, Oral, TID PRN, Jelani Tubbs MD, 200 mg at 09/21/22 0821    calcium carbonate 200 mg calcium (500 mg) chewable tablet 1,000 mg, 1,000 mg, Oral, TID PRN, Good Johnston MD    clindamycin in D5W 600 mg/50 mL IVPB 600 mg, 600 mg, Intravenous, Q8H, Jelani Tubbs MD, Stopped at 09/21/22 1220    dextrose 10 % infusion, 12.5 g, Intravenous, PRN, Patt Lauren PA-C    dextrose 10% bolus 250 mL, 25 g, Intravenous, PRN, Patt Lauren PA-C    docusate sodium capsule 100 mg, 100 mg, Oral, BID, Good Johnston MD, 100 mg at 09/21/22 0821    donepeziL tablet 10 mg, 10 mg, Oral, QHS, Jelani Tubbs MD, 10 mg at 09/20/22 2208    enoxaparin injection 30 mg, 30 mg, Subcutaneous, Daily, Patt Lauren PA-C, 30 mg at 09/20/22 1807    glucagon (human recombinant) injection 1 mg, 1 mg, Intramuscular, PRN, Patt Lauren PA-C    glucose chewable tablet 16 g, 16 g, Oral, PRN, Patt Lauren PA-C    glucose chewable tablet 24 g, 24 g, Oral, PRN, Patt Lauren PA-C    guaiFENesin 100 mg/5 ml syrup 200 mg, 200 mg, Oral, Q4H PRN, Marleen Mckeon, FNP, 200 mg at 09/20/22 2250    guaiFENesin-codeine 100-10 mg/5 ml syrup 10 mL, 10 mL, Oral, Q6H, Primitivo Giles MD    insulin aspart U-100 injection 1-10 Units, 1-10 Units, Subcutaneous, QID (AC + HS) PRN, Patt Lauren PA-C, 4 Units at 09/21/22 1203    levothyroxine tablet 75 mcg, 75 mcg, Oral, Daily, Jelani Tubbs MD, 75 mcg at 09/21/22 0821    magnesium hydroxide 400 mg/5 ml suspension 2,400 mg, 30 mL, Oral, Daily PRN, Good Johnston MD    melatonin tablet 6 mg, 6 mg, Oral, Nightly PRN, Good Johnston MD    memantine tablet 10 mg, 10 mg, Oral, BID, Jelani Tubbs MD, 10 mg at 09/21/22 0821    methylPREDNISolone sodium succinate injection 60 mg, 60 mg, Intravenous, Q8H, Primitivo Giles MD, 60 mg at 09/21/22 1318     mirtazapine tablet 7.5 mg, 7.5 mg, Oral, QHS, Caridad Roa MD, 7.5 mg at 09/20/22 2208    montelukast tablet 10 mg, 10 mg, Oral, QHS, Jelani Tubbs MD, 10 mg at 09/20/22 2208    ondansetron injection 4 mg, 4 mg, Intravenous, Q6H PRN, Patt Lauren PA-C    oxyCODONE immediate release tablet 10 mg, 10 mg, Oral, Q4H PRN, Good Johnston MD    oxyCODONE immediate release tablet 5 mg, 5 mg, Oral, Q4H PRN, Good Johnston MD    pantoprazole EC tablet 40 mg, 40 mg, Oral, Daily, Jelani Tubbs MD, 40 mg at 09/21/22 0821    piperacillin-tazobactam (ZOSYN) 4.5 g in dextrose 5 % in water (D5W) 5 % 100 mL IVPB (MB+), 4.5 g, Intravenous, Q8H, Karl Rushing MD, Last Rate: 25 mL/hr at 09/21/22 1220, 4.5 g at 09/21/22 1220    Respiratory ROS: positive for - cough, orthopnea, shortness of breath, tachypnea, and wheezing     Physical Exam  Constitutional:       Appearance: She is ill-appearing.   HENT:      Mouth/Throat:      Mouth: Mucous membranes are dry.   Cardiovascular:      Rate and Rhythm: Normal rate. Rhythm irregular.      Heart sounds: Murmur heard.   Pulmonary:      Breath sounds: Wheezing and rhonchi present.      Comments: O2 via oxymizer  Abdominal:      Palpations: Abdomen is soft.   Skin:     General: Skin is warm.      Capillary Refill: Capillary refill takes less than 2 seconds.   Neurological:      Mental Status: Mental status is at baseline.     Vitals:    09/21/22 0728 09/21/22 0836 09/21/22 1116 09/21/22 1240   BP: 122/74  (!) 152/89    Pulse: 67 88 73 89   Resp: 20 18 20 20   Temp: 100.2 °F (37.9 °C)  97.9 °F (36.6 °C)    TempSrc: Oral  Oral    SpO2: 99% 95%  96%   Weight:       Height:           Recent Results (from the past 24 hour(s))   POCT glucose    Collection Time: 09/20/22  2:53 PM   Result Value Ref Range    POCT Glucose 235 (H) 70 - 110 mg/dL   Lactic Acid, Plasma    Collection Time: 09/20/22  5:49 PM   Result Value Ref Range    Lactic Acid Level 1.8 0.5 - 2.2 mmol/L   POCT  glucose    Collection Time: 09/20/22 10:26 PM   Result Value Ref Range    POCT Glucose 137 (H) 70 - 110 mg/dL   POCT glucose    Collection Time: 09/21/22  5:45 AM   Result Value Ref Range    POCT Glucose 147 (H) 70 - 110 mg/dL   VANCOMYCIN, TROUGH    Collection Time: 09/21/22  6:49 AM   Result Value Ref Range    Vancomycin Trough 12.9 (L) 15.0 - 20.0 ug/ml   Basic Metabolic Panel    Collection Time: 09/21/22  6:49 AM   Result Value Ref Range    Sodium Level 139 136 - 145 mmol/L    Potassium Level 3.4 (L) 3.5 - 5.1 mmol/L    Chloride 109 (H) 98 - 107 mmol/L    Carbon Dioxide 22 (L) 23 - 31 mmol/L    Glucose Level 146 (H) 82 - 115 mg/dL    Blood Urea Nitrogen 27.5 (H) 9.8 - 20.1 mg/dL    Creatinine 1.01 0.55 - 1.02 mg/dL    BUN/Creatinine Ratio 27     Calcium Level Total 7.9 (L) 8.4 - 10.2 mg/dL    Anion Gap 8.0 mEq/L    eGFR 55 mls/min/1.73/m2   CBC with Differential    Collection Time: 09/21/22  6:49 AM   Result Value Ref Range    WBC 8.0 4.5 - 11.5 x10(3)/mcL    RBC 4.24 4.20 - 5.40 x10(6)/mcL    Hgb 12.3 12.0 - 16.0 gm/dL    Hct 40.0 37.0 - 47.0 %    MCV 94.3 (H) 80.0 - 94.0 fL    MCH 29.0 27.0 - 31.0 pg    MCHC 30.8 (L) 33.0 - 36.0 mg/dL    RDW 14.1 11.5 - 17.0 %    Platelet 154 130 - 400 x10(3)/mcL    MPV 10.8 (H) 7.4 - 10.4 fL    IG# 0.15 (H) 0 - 0.04 x10(3)/mcL    IG% 1.9 %    NRBC% 0.0 %   BNP    Collection Time: 09/21/22  6:49 AM   Result Value Ref Range    Natriuretic Peptide 166.0 (H) <=100.0 pg/mL   Manual Differential    Collection Time: 09/21/22  6:49 AM   Result Value Ref Range    Neut Man 79 %    Lymph Man 12 %    Monocyte Man 9 %    Instr WBC 8 x10(3)/mcL    Abs Mono 0.72 0.1 - 1.3 x10(3)/mcL    Abs Lymp 0.96 0.6 - 4.6 x10(3)/mcL    Abs Neut 6.32 2.1 - 9.2 x10(3)/mcL    RBC Morph Normal Normal    Platelet Est Normal Normal, Adequate   POCT glucose    Collection Time: 09/21/22 11:38 AM   Result Value Ref Range    POCT Glucose 223 (H) 70 - 110 mg/dL   POCT ARTERIAL BLOOD GAS    Collection Time:  09/21/22  1:18 PM   Result Value Ref Range    POC PH 7.40 7.35 - 7.45    POC PCO2 39 35 - 45 mmHg    POC PO2 59 (A) 80 - 100 mmHg    POC HEMOGLOBIN 11.5 (A) 12.0 - 16.0 g/dL    POC SATURATED O2 90.2 %    POC O2Hb 90.4 (A) 94.0 - 97.0 %    POC COHb 1.6 %    POC MetHb 0.90 0.40 - 1.5 %    POC Potassium 3.8 3.5 - 5.0 mmol/l    POC Sodium 133 (A) 137 - 145 mmol/l    POC Ionized Calcium 1.09 (A) 1.12 - 1.23 mmol/l    Correct Temperature (PH) 7.40 7.35 - 7.45    Corrected Temperature (pCO2) 39 35 - 45 mmHg    Corrected Temperature (pO2) 59 (A) 80 - 100 mmHg    POC HCO3 24.2 22.0 - 26.0 mmol/l    Base Deficit -0.50 -2.0 - 2.0 mmol/l    POC Temp 37.0 °C    Specimen source Arterial sample      Also recent echocardiogram comparison to the previous consult 2021, failed to show any vegetations and [facility] signed off     Normal systolic function.  The estimated ejection fraction is 60%.  Grade I left ventricular diastolic dysfunction.  Aortic valve sclerosis without stenosis.  Normal right ventricular size with normal right ventricular systolic function.  Mild mitral regurgitation.  Mild tricuspid regurgitation.      I reviewed all of those images and 1 back this admission and comparison to this morning.  She does not have anything removed to the to compare it to but still talking to the daughter of broken skin is and [facility] and [facility] at the walk-in clinics so I asked the radiology to reach out and obtain any available films for comparison.      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    I brought the ultrasound and inspected the pleural spaces.  The left side is full of viscus appearance although we have and to the upper chest, and very minimally is little fluid surrounding it.    She is wheezy and  probably she has been around smokers all her adult life unable to confirm that she has COPD.    Bacteremia as noted and on proper antibiotics and a repeat echo as detailed above was within normal limits.    PLAN  Comfort measures while tuning up with antibiotics, gentle diuresis and maybe a dose of steroid and continue with the neb treatments.  The daughter was updated with all those details.  She is not in any  stage or age to consider any surgical interventions.  She needs to be improved just enough to be sent back home, in my mind, with comfort measures.      Amadeo Mills

## 2022-09-21 NOTE — PT/OT/SLP PROGRESS
Orders received, chart reviewed, POC discussed with nursing. SLP attempting to see pt for possible bedside swallow evaluation however pt with audible expiratory wheeze and increased work of breathing at rest, worsened when pt briefly spoke on phone to family members.  PO intake is unsafe at this time.  Discussed recommendations with daughter of NPO due to respiratory status who verbalized understanding however daughter then gave pt lemonade with wet vocal quality and belching noted after the swallow.  Verbal education again provided regarding strict recommendations of NPO.  Discussed with nursing.  SLP to follow, will complete evaluation as appropriate.      Time discussing pt with nursing and in pt's room discussing care with daughter: 15 minutes

## 2022-09-21 NOTE — NURSING
"Patient O2 sats 89% with oxymask at 4L audible crackles, unlabored breathing. Had some difficulty waking up patient, eventually awaken with sternal rub to her normal state. VS: T 97.6 /86 P 92 states " I'm ok, notified Dr Hawley on patient state, New orders for Stat CXR, and BNP to morning labs.  "

## 2022-09-21 NOTE — CONSULTS
Inpatient consult to Palliative Care  Consult performed by: Zuly Forrester RN  Consult ordered by: Primitivo Giles MD    Patient Name: Mena Conti   MRN: [id number]   Admission Date: 9/18/2022   Hospital Length of Stay: 3   Attending Provider: Jelani Tubbs MD   Consulting Provider: Zuly Forrester RN  Reason for Consult: Goals of Care  Primary Care Physician: Waqas Vincent Sr, MD     Principal Problem: Pneumonia of left lower lobe due to infectious organism     Patient information was obtained from primary team.      Final diagnoses:  [S09.8XXA] Blunt head trauma  [A41.9] Sepsis, due to unspecified organism, unspecified whether acute organ dysfunction present  [J18.9] Pneumonia of left lower lobe due to infectious organism (Primary)  [R09.02] Hypoxia     Assessment/Plan:     I reviewed the patient and family's understanding of the seriousness of the illness and its expected prognosis. We discussed the patient's goals of care and treatment preferences. We discussed the difference between palliative and curative medicine. I explained the differences between home health, palliative and hospice care. The patient is  with 2 living biological children (daughter Pia, and son Lasha). I identified the surrogate decision maker or health care POA. I explained the difference between a living will (advanced directive) and LaPost. We discussed the patient's chosen code status and the contents of the LaPOST. I answered all questions and we formulated a plan including recommendations for symptom management and how to best achieve goals of care.    I introduced myself and Palliative service to the patient and her daughter Pia. We discussed the patient's history and current condition. We discussed the potential risks and benefits of resuscitation, intubation, and artificial nutrition by tube. Both the patient and her daughter were in agreement that they wanted to allow the patient a natural death.  DNR order entered.    Discussed home health versus home hospice care at length with daughter Pia. Patient is currently with [facility] and daughter would like informational visit from [facility] for in home hospice care. Pia understands that hospice does not provide around the clock direct hands on care but are available 24/7 to support the family in caring for the patient and for emergency visits after hours. Offered extensive support to Pia who lives with the patient and is the patient's primary caregiver. Informed Pia that Palliative will continue to follow and assist as needed.    Code Status: DNR         Recommendations:     Supportive interventions which are aligned with the patient/caregiver's goals of care. When patient is stable and ready for discharge Pia wants patient to discharge home with home hospice. Consult order entered for informational visit from [facility].      History of Present Illness:     Mrs. Conti is an 87 yr old female with a PMH of HTN, Asthma, Glaucoma, Dementia, DM2, and valvular heart disease who presented to [facility] 9/18/22 following a fall. Patient denied head injury or loss of consciousness. Reports a productive cough for the past week prior to admit. States she presented to her PCP earlier in the week due to blood pressure being elevated at home with systolic in the 190's. Lyndsay was Rx'd and she began taking it approximately 3 days prior to admit. She denied chest pain, fever, or chills. However, she was febrile, hypotensive 105/58, and SpO2 96%. Her BP decreased to 84/53 which was responsive to IV fluid hydration. Labs were notable for WBC 5.3, Hgb 11.7, Hct 40.3,, K 5.4, CO2 18, BUN/Creat 26.3/1.78, Glucose 246, Lactic acid 5.4 Ethanol level less than 10. Influenza A and B and SARS-CoV2 PCR negative. X-ray of pelvis - without fracture. CT head without acute intracranial abnormalities. CT C/A/P with left basilar consolidative and  ground-glass airspace opacity concerning for infection. Heterogeneous enhancement of the spleen likely perfusional and a large left hiatal hernia. 9/21 blood cultures grew Staph hominis with sensitivities noted. Vancomycin was discontinued and Clindamycin initiated. Palliative consulted to discuss Goals of Care and ACP. Will continue to follow for assistance with plan of care.      Active Ambulatory Problems     Diagnosis Date Noted    No Active Ambulatory Problems     Resolved Ambulatory Problems     Diagnosis Date Noted    No Resolved Ambulatory Problems     No Additional Past Medical History        History reviewed. No pertinent surgical history.     Review of patient's allergies indicates:  No Known Allergies       Current Facility-Administered Medications:     acetaminophen tablet 650 mg, 650 mg, Oral, Q4H, Good Johnston MD, 650 mg at 09/21/22 0546    acetaminophen tablet 650 mg, 650 mg, Oral, Q4H PRN, Patt Lauren PA-C    albuterol-ipratropium 2.5 mg-0.5 mg/3 mL nebulizer solution 3 mL, 3 mL, Nebulization, Q4H WAKE, Primitivo Giles MD, 3 mL at 09/21/22 1240    benzonatate capsule 200 mg, 200 mg, Oral, TID PRN, Jelani Tubbs MD, 200 mg at 09/21/22 0821    calcium carbonate 200 mg calcium (500 mg) chewable tablet 1,000 mg, 1,000 mg, Oral, TID PRN, Good Johnston MD    clindamycin in D5W 600 mg/50 mL IVPB 600 mg, 600 mg, Intravenous, Q8H, Jelani Tubbs MD, Stopped at 09/21/22 1220    dextrose 10 % infusion, 12.5 g, Intravenous, PRN, Patt Lauren PA-C    dextrose 10% bolus 250 mL, 25 g, Intravenous, PRN, Patt Lauren PA-C    docusate sodium capsule 100 mg, 100 mg, Oral, BID, Good Johnston MD, 100 mg at 09/21/22 0821    donepeziL tablet 10 mg, 10 mg, Oral, QHS, eJlani Tubbs MD, 10 mg at 09/20/22 2208    enoxaparin injection 30 mg, 30 mg, Subcutaneous, Daily, Patt Lauren PA-C, 30 mg at 09/20/22 1807    fluticasone-umeclidin-vilanter 100-62.5-25 mcg DsDv 1  puff, 1 puff, Inhalation, Daily, Primitivo Giles MD    glucagon (human recombinant) injection 1 mg, 1 mg, Intramuscular, PRN, Patt Lauren PA-C    glucose chewable tablet 16 g, 16 g, Oral, PRN, Patt Lauren PA-C    glucose chewable tablet 24 g, 24 g, Oral, PRN, Patt Lauren PA-C    guaiFENesin 100 mg/5 ml syrup 200 mg, 200 mg, Oral, Q4H PRN, Marleen Mckeon, FNP, 200 mg at 09/21/22 1515    guaiFENesin-codeine 100-10 mg/5 ml syrup 10 mL, 10 mL, Oral, Q6H, Primitivo Giles MD    insulin aspart U-100 injection 1-10 Units, 1-10 Units, Subcutaneous, QID (AC + HS) PRN, Patt Lauren PA-C, 4 Units at 09/21/22 1203    levothyroxine tablet 75 mcg, 75 mcg, Oral, Daily, Jelani Tubbs MD, 75 mcg at 09/21/22 0821    magnesium hydroxide 400 mg/5 ml suspension 2,400 mg, 30 mL, Oral, Daily PRN, Good Johnston MD    melatonin tablet 6 mg, 6 mg, Oral, Nightly PRN, Good Johnston MD    memantine tablet 10 mg, 10 mg, Oral, BID, Jelani Tubbs MD, 10 mg at 09/21/22 0821    methylPREDNISolone sodium succinate injection 60 mg, 60 mg, Intravenous, Q8H, Primitivo Giles MD, 60 mg at 09/21/22 1318    mirtazapine tablet 7.5 mg, 7.5 mg, Oral, QHS, Caridad Roa MD, 7.5 mg at 09/20/22 2208    montelukast tablet 10 mg, 10 mg, Oral, QHS, Jelani Tubbs MD, 10 mg at 09/20/22 2208    ondansetron injection 4 mg, 4 mg, Intravenous, Q6H PRN, Patt Lauren PA-C    oxyCODONE immediate release tablet 10 mg, 10 mg, Oral, Q4H PRN, Good Johnston MD    oxyCODONE immediate release tablet 5 mg, 5 mg, Oral, Q4H PRN, Good Johnston MD    pantoprazole EC tablet 40 mg, 40 mg, Oral, Daily, Jelani Tubbs MD, 40 mg at 09/21/22 0821    piperacillin-tazobactam (ZOSYN) 4.5 g in dextrose 5 % in water (D5W) 5 % 100 mL IVPB (MB+), 4.5 g, Intravenous, Q8H, Karl Rushing MD, Last Rate: 25 mL/hr at 09/21/22 1220, 4.5 g at 09/21/22 1220     acetaminophen, benzonatate, calcium carbonate, dextrose 10 % in water  "(D10W), dextrose 10%, glucagon (human recombinant), glucose, glucose, guaiFENesin 100 mg/5 ml, insulin aspart U-100, magnesium hydroxide 400 mg/5 ml, melatonin, ondansetron, oxyCODONE, oxyCODONE     History reviewed. No pertinent family history.     Review of Systems   Unable to perform ROS: Dementia          Objective:   BP (!) 161/98   Pulse 78   Temp 97.7 °F (36.5 °C) (Oral)   Resp 18   Ht 5' 8" (1.727 m)   Wt 71.5 kg (157 lb 10.1 oz)   SpO2 (!) 93%   Breastfeeding No   BMI 23.97 kg/m²      Physical Exam  Constitutional:       General: She is in acute distress.      Appearance: She is ill-appearing.   HENT:      Head: Normocephalic.      Right Ear: External ear normal.      Left Ear: External ear normal.      Nose: Nose normal.      Mouth/Throat:      Pharynx: Oropharynx is clear.   Eyes:      Pupils: Pupils are equal, round, and reactive to light.   Cardiovascular:      Rate and Rhythm: Normal rate.      Pulses: Normal pulses.   Pulmonary:      Effort: Respiratory distress present.      Comments: Diminished breath sounds  Abdominal:      General: Bowel sounds are normal.      Palpations: Abdomen is soft.   Musculoskeletal:      Right lower leg: No edema.      Left lower leg: No edema.   Skin:     General: Skin is warm and dry.   Neurological:      Comments: Dementia - knows own name and birthdate, identifies daughter by name   Psychiatric:         Mood and Affect: Mood normal.         Behavior: Behavior normal.         FAMILY CONTACTS:     Review of Symptoms  Review of Symptoms      Symptom Assessment (ESAS 0-10 Scale)  Pain:  0  Dyspnea:  0  Anxiety:  0  Nausea:  0  Depression:  0  Anorexia:  0  Fatigue:  0  Insomnia:  0  Restlessness:  0  Agitation:  0     CAM / Delirium:  Negative  Constipation:  Negative  Diarrhea:  Negative      Bowel Management Plan (BMP):  No      Performance Status:  40    Living Arrangements:  Lives with family and Lives in home    Psychosocial/Cultural: 88 y/o female, . " "Lives in her home - daughter Pia lives with her and is primary caregiver.    Spiritual:  F - Zhane and Belief:  "Congregational and Adventism"  I - Importance:  Very important     Time-Based Charting:  No    Advance Care Planning   Advance Directives:   LaPOST: Yes    Do Not Resuscitate Status: Yes    Agent's Name:  Pia    Decision Making:  Family answered questions        PAINAD: N/A    Caregiver burden formerly assessed: yes        Zuly Forrester RN  Palliative Medicine  [facility]    "

## 2022-09-21 NOTE — PROGRESS NOTES
Pharmacokinetic Assessment Follow Up: IV Vancomycin    Vancomycin serum concentration assessment(s):    The random level was drawn correctly and can be used to guide therapy at this time. The measurement is below the desired definitive target range of 15 to 20 mcg/mL.    Vancomycin Regimen Plan:  Patient has had significant improvement in renal function  Will stop pulse dosing and start a regimen of 1250 mg IV vancomycin every 18 hours  Check trough on 09/22 @2000    Drug levels (last 3 results):  Recent Labs   Lab Result Units 09/20/22  0748 09/21/22  0649   Vanc Lvl Random ug/ml 11.3*  --    Vancomycin Trough ug/ml  --  12.9*       Pharmacy will continue to follow and monitor vancomycin.    Please contact pharmacy at extension [phone] for questions regarding this assessment.    Thank you for the consult,   Jovi Mccullough       Patient brief summary:  Mena Conti is a 87 y.o. female initiated on antimicrobial therapy with IV Vancomycin for treatment of bacteremia    The patient's current regimen is 1250 mg IV every 18 hours    Drug Allergies:   Review of patient's allergies indicates:  No Known Allergies    Actual Body Weight:   71.5 kg    Renal Function:   Estimated Creatinine Clearance: 41.1 mL/min (based on SCr of 1.01 mg/dL).,     Dialysis Method (if applicable):  N/A    CBC (last 72 hours):  Recent Labs   Lab Result Units 09/18/22  1211 09/19/22  0429 09/20/22  0748 09/21/22  0649   WBC x10(3)/mcL 5.3 5.6 9.8 8.0   Hgb gm/dL 11.7* 12.6 12.5 12.3   Hct % 40.3 41.1 40.0 40.0   Platelet x10(3)/mcL 154 136 133 154   Monocyte Man % 14 5 8  --        Metabolic Panel (last 72 hours):  Recent Labs   Lab Result Units 09/18/22  1211 09/19/22  0413 09/19/22  0528 09/20/22  0748 09/21/22  0649   Sodium Level mmol/L 140  --  137 137 139   Potassium Level mmol/L 5.4*  --  4.8 4.2 3.4*   Chloride mmol/L 108*  --  110* 107 109*   Carbon Dioxide mmol/L 18*  --  16* 19* 22*   Glucose Level mg/dL 246*  --  145* 127* 146*    Glucose, UA mg/dL  --  Negative  --   --   --    Blood Urea Nitrogen mg/dL 26.3*  --  30.3* 31.5* 27.5*   Creatinine mg/dL 1.78*  --  1.48* 1.30* 1.01   Albumin Level gm/dL 2.8*  --  2.6*  --   --    Bilirubin Total mg/dL 0.7  --  0.7  --   --    Alkaline Phosphatase unit/L 52  --  52  --   --    Aspartate Aminotransferase unit/L 24  --  30  --   --    Alanine Aminotransferase unit/L 12  --  15  --   --        Vancomycin Administrations:  vancomycin given in the last 96 hours                     vancomycin in dextrose 5 % 1 gram/250 mL IVPB 1,000 mg (mg) 1,000 mg New Bag 09/20/22 1100    vancomycin 1.25 g in dextrose 5% 250 mL IVPB (ready to mix) (mg) 1,250 mg New Bag 09/19/22 1709                    Microbiologic Results:  Microbiology Results (last 7 days)       Procedure Component Value Units Date/Time    Blood Culture #1 **CANNOT BE ORDERED STAT** [[id number]]  (Normal) Collected: 09/18/22 1446    Order Status: Completed Specimen: Blood Updated: 09/20/22 1601     CULTURE, BLOOD ([facility]) No Growth At 48 Hours    Blood culture #2 **CANNOT BE ORDERED STAT** [[id number]]  (Abnormal) Collected: 09/18/22 1446    Order Status: Completed Specimen: Blood Updated: 09/20/22 1207     CULTURE, BLOOD ([facility]) Identification and Susceptibility To Follow      Gram-positive coccus probable staph     GRAM STAIN Gram Positive Cocci, probable Staphylococcus      Seen in gram stain of broth only      1 of 1 Aerobic bottle positive

## 2022-09-21 NOTE — PT/OT/SLP EVAL
Physical Therapy Evaluation    Patient Name:  Mena Conti   MRN:  [id number]    Recommendations:     Discharge Recommendations:  nursing facility, skilled, home health PT   Discharge Equipment Recommendations: none   Barriers to discharge:  medical status    Assessment:     Mena Conti is a 87 y.o. female admitted with a medical diagnosis of Pneumonia of left lower lobe due to infectious organism. Patient with history of dementia. She lives with daughter in single level home. She requires assistance with ADL's and uses RW for ambulation. She has great family support. Granddaughter stays with patient during day and daughter assist her at night.  She presents with the following impairments/functional limitations:  weakness, impaired endurance, impaired self care skills, gait instability, impaired functional mobility, impaired balance, decreased safety awareness, impaired cardiopulmonary response to activity. She required MOD A for bed mobility. MIN A for sit<>stand. Ambulated 13 ft with RW & CGA. Limited by SOB & fatigue. Progress patient as tolerated.     Rehab Prognosis: Fair; patient would benefit from acute skilled PT services to address these deficits and reach maximum level of function.    Recent Surgery: * No surgery found *      Plan:     During this hospitalization, patient to be seen 5 x/week to address the identified rehab impairments via gait training, therapeutic activities, therapeutic exercises, neuromuscular re-education and progress toward the following goals:    Plan of Care Expires:  10/21/22    Subjective     Chief Complaint: SOB  Patient/Family Comments/goals: none  Pain/Comfort:  Pain Rating 1: 0/10    Patients cultural, spiritual, Rastafarian conflicts given the current situation: no    Living Environment:  She lives with daughter in single level home.  Prior to admission, she required assistance with ADL's and mobility. Equipment used at home: walker, rolling, bedside commode, glucometer,  cane, straight.  DME owned (not currently used): none.  Upon discharge, patient will have assistance from family.    Objective:     Communicated with nurse prior to session.  Patient found supine with telemetry, oxygen  upon PT entry to room.    General Precautions: Standard, fall   Orthopedic Precautions:N/A   Braces: N/A  Respiratory Status:  oxymask 3L/min. Sats 84%-92% during session    Exams:  Cognitive Exam:  Patient is oriented to Person and Place  Sensation:    -       Intact  RLE ROM: WFL  RLE Strength: -4/5 grossly  LLE ROM: WFL  LLE Strength: -4/5 grossly    Functional Mobility:  Bed Mobility:     Supine to Sit: moderate assistance  Transfers:     Sit to Stand:  minimum assistance with rolling walker  Gait: 13 ft with RW & CGA  Balance: fair/poor      AM-PAC 6 CLICK MOBILITY  Total Score:16     Patient left up in chair with all lines intact, call button in reach, and family & MD present.    GOALS:   Multidisciplinary Problems       Physical Therapy Goals          Problem: Physical Therapy    Goal Priority Disciplines Outcome Goal Variances Interventions   Physical Therapy Goal     PT, PT/OT Ongoing, Progressing     Description: Goals to be met by: 10/21/22     Patient will increase functional independence with mobility by performin. Supine to sit with Stand-by Assistance  2. Sit to supine with Stand-by Assistance  3. Sit to stand transfer with Supervision  4. Gait  x 200 feet with Contact Guard Assistance using Rolling Walker.                          History:     History reviewed. No pertinent past medical history.    History reviewed. No pertinent surgical history.    Time Tracking:     PT Received On: 22  PT Start Time: 1045     PT Stop Time: 1107  PT Total Time (min): 22 min     Billable Minutes: Evaluation 22 minutes      2022

## 2022-09-22 LAB
ANION GAP SERPL CALC-SCNC: 15 MEQ/L
BUN SERPL-MCNC: 25.4 MG/DL (ref 9.8–20.1)
CALCIUM SERPL-MCNC: 9.1 MG/DL (ref 8.4–10.2)
CHLORIDE SERPL-SCNC: 103 MMOL/L (ref 98–107)
CO2 SERPL-SCNC: 23 MMOL/L (ref 23–31)
CREAT SERPL-MCNC: 1.18 MG/DL (ref 0.55–1.02)
CREAT/UREA NIT SERPL: 22
GFR SERPLBLD CREATININE-BSD FMLA CKD-EPI: 45 MLS/MIN/1.73/M2
GLUCOSE SERPL-MCNC: 291 MG/DL (ref 82–115)
MAGNESIUM SERPL-MCNC: 2.3 MG/DL (ref 1.6–2.6)
POCT GLUCOSE: 298 MG/DL (ref 70–110)
POTASSIUM SERPL-SCNC: 4.1 MMOL/L (ref 3.5–5.1)
SODIUM SERPL-SCNC: 141 MMOL/L (ref 136–145)

## 2022-09-22 PROCEDURE — 83735 ASSAY OF MAGNESIUM: CPT | Performed by: INTERNAL MEDICINE

## 2022-09-22 PROCEDURE — 99900031 HC PATIENT EDUCATION (STAT)

## 2022-09-22 PROCEDURE — 21400001 HC TELEMETRY ROOM

## 2022-09-22 PROCEDURE — 97530 THERAPEUTIC ACTIVITIES: CPT | Mod: CQ

## 2022-09-22 PROCEDURE — 94761 N-INVAS EAR/PLS OXIMETRY MLT: CPT

## 2022-09-22 PROCEDURE — 63600175 PHARM REV CODE 636 W HCPCS: Performed by: INTERNAL MEDICINE

## 2022-09-22 PROCEDURE — 97166 OT EVAL MOD COMPLEX 45 MIN: CPT

## 2022-09-22 PROCEDURE — 25000003 PHARM REV CODE 250: Performed by: INTERNAL MEDICINE

## 2022-09-22 PROCEDURE — 80048 BASIC METABOLIC PNL TOTAL CA: CPT | Performed by: INTERNAL MEDICINE

## 2022-09-22 PROCEDURE — 25000242 PHARM REV CODE 250 ALT 637 W/ HCPCS: Performed by: INTERNAL MEDICINE

## 2022-09-22 PROCEDURE — 25000003 PHARM REV CODE 250: Performed by: NURSE PRACTITIONER

## 2022-09-22 PROCEDURE — 25000003 PHARM REV CODE 250: Performed by: PHYSICIAN ASSISTANT

## 2022-09-22 PROCEDURE — 94640 AIRWAY INHALATION TREATMENT: CPT

## 2022-09-22 PROCEDURE — 99900035 HC TECH TIME PER 15 MIN (STAT)

## 2022-09-22 PROCEDURE — 25000003 PHARM REV CODE 250: Performed by: EMERGENCY MEDICINE

## 2022-09-22 PROCEDURE — 27000221 HC OXYGEN, UP TO 24 HOURS

## 2022-09-22 PROCEDURE — 25000003 PHARM REV CODE 250

## 2022-09-22 PROCEDURE — 97116 GAIT TRAINING THERAPY: CPT | Mod: CQ

## 2022-09-22 PROCEDURE — 36415 COLL VENOUS BLD VENIPUNCTURE: CPT | Performed by: INTERNAL MEDICINE

## 2022-09-22 RX ORDER — HYDRALAZINE HYDROCHLORIDE 20 MG/ML
10 INJECTION INTRAMUSCULAR; INTRAVENOUS EVERY 4 HOURS PRN
Status: DISCONTINUED | OUTPATIENT
Start: 2022-09-22 | End: 2022-09-23 | Stop reason: HOSPADM

## 2022-09-22 RX ORDER — LABETALOL HYDROCHLORIDE 5 MG/ML
10 INJECTION, SOLUTION INTRAVENOUS EVERY 4 HOURS PRN
Status: DISCONTINUED | OUTPATIENT
Start: 2022-09-22 | End: 2022-09-23 | Stop reason: HOSPADM

## 2022-09-22 RX ADMIN — DONEPEZIL HYDROCHLORIDE 10 MG: 5 TABLET, FILM COATED ORAL at 09:09

## 2022-09-22 RX ADMIN — IPRATROPIUM BROMIDE AND ALBUTEROL SULFATE 3 ML: 2.5; .5 SOLUTION RESPIRATORY (INHALATION) at 04:09

## 2022-09-22 RX ADMIN — METHYLPREDNISOLONE SODIUM SUCCINATE 60 MG: 40 INJECTION, POWDER, FOR SOLUTION INTRAMUSCULAR; INTRAVENOUS at 06:09

## 2022-09-22 RX ADMIN — METHYLPREDNISOLONE SODIUM SUCCINATE 60 MG: 40 INJECTION, POWDER, FOR SOLUTION INTRAMUSCULAR; INTRAVENOUS at 09:09

## 2022-09-22 RX ADMIN — PIPERACILLIN AND TAZOBACTAM 4.5 G: 4; .5 INJECTION, POWDER, LYOPHILIZED, FOR SOLUTION INTRAVENOUS; PARENTERAL at 07:09

## 2022-09-22 RX ADMIN — PIPERACILLIN AND TAZOBACTAM 4.5 G: 4; .5 INJECTION, POWDER, LYOPHILIZED, FOR SOLUTION INTRAVENOUS; PARENTERAL at 03:09

## 2022-09-22 RX ADMIN — CLINDAMYCIN IN 5 PERCENT DEXTROSE 600 MG: 12 INJECTION, SOLUTION INTRAVENOUS at 10:09

## 2022-09-22 RX ADMIN — DOCUSATE SODIUM 100 MG: 100 CAPSULE, LIQUID FILLED ORAL at 09:09

## 2022-09-22 RX ADMIN — ACETAMINOPHEN 650 MG: 325 TABLET ORAL at 06:09

## 2022-09-22 RX ADMIN — METHYLPREDNISOLONE SODIUM SUCCINATE 60 MG: 40 INJECTION, POWDER, FOR SOLUTION INTRAMUSCULAR; INTRAVENOUS at 02:09

## 2022-09-22 RX ADMIN — ACETAMINOPHEN 650 MG: 325 TABLET ORAL at 02:09

## 2022-09-22 RX ADMIN — ACETAMINOPHEN 650 MG: 325 TABLET ORAL at 10:09

## 2022-09-22 RX ADMIN — IPRATROPIUM BROMIDE AND ALBUTEROL SULFATE 3 ML: 2.5; .5 SOLUTION RESPIRATORY (INHALATION) at 03:09

## 2022-09-22 RX ADMIN — MIRTAZAPINE 7.5 MG: 7.5 TABLET ORAL at 09:09

## 2022-09-22 RX ADMIN — GUAIFENESIN AND CODEINE PHOSPHATE 10 ML: 100; 10 SOLUTION ORAL at 09:09

## 2022-09-22 RX ADMIN — MEMANTINE 10 MG: 5 TABLET ORAL at 09:09

## 2022-09-22 RX ADMIN — MONTELUKAST 10 MG: 10 TABLET, FILM COATED ORAL at 09:09

## 2022-09-22 RX ADMIN — IPRATROPIUM BROMIDE AND ALBUTEROL SULFATE 3 ML: 2.5; .5 SOLUTION RESPIRATORY (INHALATION) at 08:09

## 2022-09-22 RX ADMIN — CLINDAMYCIN IN 5 PERCENT DEXTROSE 600 MG: 12 INJECTION, SOLUTION INTRAVENOUS at 04:09

## 2022-09-22 RX ADMIN — CLINDAMYCIN IN 5 PERCENT DEXTROSE 600 MG: 12 INJECTION, SOLUTION INTRAVENOUS at 09:09

## 2022-09-22 RX ADMIN — GUAIFENESIN 200 MG: 200 SOLUTION ORAL at 10:09

## 2022-09-22 RX ADMIN — PIPERACILLIN AND TAZOBACTAM 4.5 G: 4; .5 INJECTION, POWDER, LYOPHILIZED, FOR SOLUTION INTRAVENOUS; PARENTERAL at 10:09

## 2022-09-22 RX ADMIN — IPRATROPIUM BROMIDE AND ALBUTEROL SULFATE 3 ML: 2.5; .5 SOLUTION RESPIRATORY (INHALATION) at 12:09

## 2022-09-22 RX ADMIN — ACETAMINOPHEN 650 MG: 325 TABLET ORAL at 09:09

## 2022-09-22 RX ADMIN — GUAIFENESIN AND CODEINE PHOSPHATE 10 ML: 100; 10 SOLUTION ORAL at 01:09

## 2022-09-22 NOTE — PT/OT/SLP PROGRESS
SLP attempting to see pt x2 for possible bedside swallow evaluation however pt with O2 noted 83-87%.  Discussed with nursing and OT who report pt short of breath with minimal activity and unable to speak.  Discussed with case management.  SLP rec: NPO due to respiratory status.  SLP to follow, will complete evaluation as appropriate.

## 2022-09-22 NOTE — PROGRESS NOTES
[facility] Hospital Medicine Progress Note        Chief Complaint: Inpatient Follow-up for pneumonia     HPI:   Mena Conti is a 87 year old  female with a past medical history of hypertension, asthma, glaucoma, dementia, diabetes mellitus type 2 and valvular heart disease. The patient presented to [facility] on 9/18/2022 following a fall. Granddaughter who was at bedside states patient told her daughter with whom she lives that she was going to the bathroom. When daughter went to check on her she was found on the ground but awake and alert. Patient denies head injury or loss of consciousness.  Patient has been having a productive cough for the last week.  She presented to her PCP earlier in the week due to blood pressure being elevated at home with a systolic in the 190s and Lyndsay was prescribed.  She began taking Lyndsay approximately 3 days ago.  Patient denies complaints of chest pain, fever, chills, sweats, shortness of breath.  She lives with daughter, ambulates with walker and needs assistance with activities of daily living.     Upon presentation to the ED, patient was febrile with a temperature 100.9° F, blood pressure 105/58 and SpO2 96% on room air.  Patient's blood pressure decreased to 84/53 which responded to IV fluid hydration.  She was placed on supplemental oxygen via nasal cannula which has since been titrated up to 4 L. labs were notable for WBC 5.3, hemoglobin 11.7, hematocrit 40.3, .8, potassium 5.4, CO2 18, BUN/creatinine 26.3/1.78, glucose 246, lactic acid 5.4.  No prior labs for comparison.  Ethanol level less than 10.  Influenza A and B and SARS-CoV-2 PCR negative.  Chest x-ray with moderate left pleural effusion with basilar airspace opacities.  X-ray of the pelvis without fracture.  CT head without acute intracranial abnormalities.  CT of the cervical spine with asymmetry in the interval between the dens and lateral masses of the C1 appears  position with unremarkable alignment of the lateral masses of C1 and C2 and small old avulsed fragment from the right lateral mass of C1.  CT chest, abdomen and pelvis with left basilar consolidative and ground-glass airspace opacity concerning for infection, heterogeneous enhancement of the spleen likely perfusional and a large left hiatal hernia.        Patient currently being managed for left lower lobe pneumonia with hypoxic respiratory failure, Staph hominis bacteremia.  It has a history of COPD and asthma.     Interval Hx:  Patient doing okay this morning.  Her daughter is at the bedside.  She was originally on 3 L nasal cannula but then tried to get up to go to the bathroom and the exertion started dropping her oxygen saturation into the 70s.  She has bumped up to 4 L and now in the upper 80s.  Patient is alert.  She can tell she is in the hospital and she knows who her daughter is.  We discussed a plan to continue IV Zosyn and Pulmonary is following along.  She is currently NPO due to concern for choking episodes.    Objective/physical exam:  General: In no acute distress, afebrile  Chest: Clear to auscultation bilaterally  Heart: RRR, +S1, S2, no appreciable murmur  Abdomen: Soft, nontender, BS +  MSK: Warm, no lower extremity edema, no clubbing or cyanosis  Neurologic: Alert and oriented x4, Cranial nerve II-XII intact, Strength 5/5 in all 4 extremities    VITAL SIGNS: 24 HRS MIN & MAX LAST   Temp  Min: 97.7 °F (36.5 °C)  Max: 100.2 °F (37.9 °C) 97.8 °F (36.6 °C)   BP  Min: 122/74  Max: 185/103 (!) 185/103     Pulse  Min: 65  Max: 92  77   Resp  Min: 16  Max: 28 18   SpO2  Min: 90 %  Max: 99 % (!) 90 %         Recent Labs   Lab 09/19/22  0429 09/20/22  0748 09/21/22  0649   WBC 5.6 9.8 8.0   RBC 4.31 4.29 4.24   HGB 12.6 12.5 12.3   HCT 41.1 40.0 40.0   MCV 95.4* 93.2 94.3*   MCH 29.2 29.1 29.0   MCHC 30.7* 31.3* 30.8*   RDW 14.0 14.1 14.1    133 154   MPV 11.6* 11.3* 10.8*       Recent Labs   Lab  09/18/22  1211 09/19/22  0528 09/20/22  0748 09/21/22  0649 09/21/22  1318    137 137 139  --    K 5.4* 4.8 4.2 3.4*  --    CO2 18* 16* 19* 22*  --    BUN 26.3* 30.3* 31.5* 27.5*  --    CREATININE 1.78* 1.48* 1.30* 1.01  --    CALCIUM 9.0 8.4 7.8* 7.9*  --    PH  --   --   --   --  7.40   ALBUMIN 2.8* 2.6*  --   --   --    ALKPHOS 52 52  --   --   --    ALT 12 15  --   --   --    AST 24 30  --   --   --    BILITOT 0.7 0.7  --   --   --           Microbiology Results (last 7 days)       Procedure Component Value Units Date/Time    Blood Culture #1 **CANNOT BE ORDERED STAT** [[id number]]  (Normal) Collected: 09/18/22 1446    Order Status: Completed Specimen: Blood Updated: 09/21/22 1601     CULTURE, BLOOD ([facility]) No Growth At 72 Hours    Respiratory Culture [[id number]] Collected: 09/21/22 1358    Order Status: Sent Specimen: Sputum, Expectorated Updated: 09/21/22 1443    Blood Culture [[id number]] Collected: 09/21/22 0938    Order Status: Resulted Specimen: Blood from Arm, Right Updated: 09/21/22 0945    Blood Culture [[id number]] Collected: 09/21/22 0938    Order Status: Resulted Specimen: Blood from Arm, Left Updated: 09/21/22 0945    Blood culture #2 **CANNOT BE ORDERED STAT** [[id number]]  (Abnormal)  (Susceptibility) Collected: 09/18/22 1446    Order Status: Completed Specimen: Blood Updated: 09/21/22 0906     CULTURE, BLOOD ([facility]) Staphylococcus hominis subsp. hominis     GRAM STAIN Gram Positive Cocci, probable Staphylococcus      Seen in gram stain of broth only      1 of 1 Aerobic bottle positive             See below for Radiology    Scheduled Med:   acetaminophen  650 mg Oral Q4H    albuterol-ipratropium  3 mL Nebulization Q4H WAKE    clindamycin (CLEOCIN) IVPB  600 mg Intravenous Q8H    docusate sodium  100 mg Oral BID    donepeziL  10 mg Oral QHS    enoxaparin  30 mg Subcutaneous Daily    fluticasone-umeclidin-vilanter  1 puff Inhalation Daily    guaiFENesin-codeine 100-10 mg/5 ml  10 mL Oral Q6H     levothyroxine  75 mcg Oral Daily    memantine  10 mg Oral BID    methylPREDNISolone sodium succinate injection  60 mg Intravenous Q8H    mirtazapine  7.5 mg Oral QHS    montelukast  10 mg Oral QHS    pantoprazole  40 mg Oral Daily    piperacillin-tazobactam (ZOSYN) IVPB  4.5 g Intravenous Q8H        Continuous Infusions:       PRN Meds:  acetaminophen, albuterol-ipratropium, ALPRAZolam, benzonatate, calcium carbonate, dextrose 10 % in water (D10W), dextrose 10%, glucagon (human recombinant), glucose, glucose, guaiFENesin 100 mg/5 ml, insulin aspart U-100, magnesium hydroxide 400 mg/5 ml, melatonin, ondansetron, oxyCODONE, oxyCODONE       Assessment/Plan:   Staphylococcal sepsis  Left lower lobe bacterial pneumonia  Large left hiatal hernia  Acute hypoxic respiratory failure  Chronic obstructive pulmonary disease  Metabolic acidosis  Acute kidney injury versus chronic kidney disease  Non-insulin-dependent type 2 diabetes it is mellitus   Essential hypertension  Glaucoma  Valvular heart disease   Grade 1 diastolic dysfunction  Alzheimer's dementia      Patient is on day 5 Zosyn.  She did grow Staph hominis in 1 of 2 blood cultures.  Low suspicion of this is actually the organism that is causing her acute pneumonia but she does seem to be responding to Zosyn so will continue for now.  More likely that Staphalococcal agent is a contaminant but no other organism has grown.    continue to wean her O2 as tolerated.  Suspect that she has some chronic aspiration that is also contributing to her hypoxemic respiratory failure.  She is currently NPO in will undergo speech eval this morning  Pulmonary following along.  No aggressive interventions planned at this point in time.  Will see if we can get her cleared for oral diet and wean her O2 down.  She may benefit from a palliative care consult as well.  Will see how she does with speech therapy today and then make it decision.  Plan:   Continue empiric antibiotic therapy,  supplemental oxygen, appropriate home medications, and other supportive care     Critical Care Diagnosis: Acute hypoxic respiratory failure requiring high flow supplemental oxygen; sepsis requiring broad-spectrum intravenous antibiotics  Critical Care Interventions: Hands-on evaluation, review of labs, radiographs, medical records, and discussion with the patient and medical staff in order to assess and manage the high probability of imminent or life-threatening deterioration of cardio-respiratory status requiring vasopressor support and/or intubation and mechanical ventilation.  Critical Care Time Spent: 35 minutes    Patient condition:  guarded    Anticipated discharge and Disposition: TBD      All diagnosis and differential diagnosis have been reviewed; assessment and plan has been documented; I have personally reviewed the labs and test results that are presently available; I have reviewed the patients medication list; I have reviewed the consulting providers response and recommendations. I have reviewed or attempted to review medical records based upon their availability    All of the patient's questions have been  addressed and answered. Patient's is agreeable to the above stated plan. I will continue to monitor closely and make adjustments to medical management as needed.  _____________________________________________________________________      Radiology:  X-Ray Chest 1 View  Narrative: EXAMINATION:  XR CHEST 1 VIEW    CPT 26064    CLINICAL HISTORY:  sob;    COMPARISON:  September 18, 2022    FINDINGS:  Cardiomediastinal silhouette to be unchanged as compared with the previous exam there is some worsening consolidative changes in the left lung which might be related to pleural fluid and or represent an infiltrate/atelectasis    No other interval change  Impression: Worsening consolidative changes in the left lung which might be related to pleural fluid or represent an infiltrate/atelectasis    No other  change    Electronically signed by: Josh White  Date:    09/21/2022  Time:    08:20      Epi Donis MD   09/22/2022

## 2022-09-22 NOTE — NURSING
Due to families multiple requests for PO intake, I spoke to Dr. Donis through secure chat and then via telephone regarding pts current npo status as recommended by speech therapy. Jewels ordered pureed diet with moderately thick liquids. Educated family on thick liquids and continued aspiration risk.

## 2022-09-22 NOTE — PT/OT/SLP EVAL
"Occupational Therapy   Evaluation    Name: Mena Conti  MRN: [id number]  Admitting Diagnosis:  Pneumonia of left lower lobe due to infectious organism  Recent Surgery: * No surgery found *      Recommendations:     Discharge Recommendations:  (home with HH/24 hr family care)  Discharge Equipment Recommendations:  none  Barriers to discharge:  None    Assessment:     Mena Conti is a 87 y.o. female with a medical diagnosis of Pneumonia of left lower lobe due to infectious organism, fall.  She presents with generalized deconditioning, SOB/ORELLANA, and desaturations with mobility. Performance deficits affecting function: weakness, impaired endurance, impaired self care skills, impaired functional mobility, impaired cardiopulmonary response to activity.      Rehab Prognosis: Fair; patient would benefit from acute skilled OT services to address these deficits and reach maximum level of function.       Plan:     Patient to be seen 3 x/week, 5 x/week to address the above listed problems via self-care/home management, therapeutic activities, therapeutic exercises  Plan of Care Expires: 10/06/22  Plan of Care Reviewed with: patient, daughter    Subjective     Chief Complaint: "short winded"  Patient/Family Comments/goals: decreased burden of care    Occupational Profile:  Living Environment: pt lives with daughter and son in law in single level home   Previous level of function: min A with ADL (able to toilet and perform UB ADLs, needs CGA-SPV for safety and assist with bathing)  Roles and Routines: ADLs  Equipment Used at Home:  wheelchair, walker, rolling, bath bench, shower chair, raised toilet, bedside commode  Assistance upon Discharge: family    Pain/Comfort:  Pain Rating 1: 0/10    Patients cultural, spiritual, Church conflicts given the current situation: no    Objective:     Patient found up in chair with telemetry, pulse ox (continuous), peripheral IV, PureWick upon OT entry to room.    General Precautions: " Standard, fall   Orthopedic Precautions:N/A   Braces: N/A  Respiratory Status:  oxymizer, 5L  Sp02 88-91% with inconsistent pulse ox readings    Occupational Performance:    Functional Mobility/Transfers:  Patient completed Sit <> Stand Transfer with minimum assistance  with  rolling walker   Functional Mobility: min A t/f chair <> bsc     Activities of Daily Living:  Lower Body Dressing: maximal assistance .  Toileting: maximal assistance purwick, brief    Cognitive/Visual Perceptual:  Cognitive/Psychosocial Skills:     -       Oriented to: Person and Place   -       Follows Commands/attention:Follows one-step commands  -       Safety awareness/insight to disability: impaired     Physical Exam:  Sit balance: SBA  Stand balance: min A      AMPAC 6 Click ADL:  AMPAC Total Score: 15    Treatment & Education:  Initial eval performed. Pt/daughter educated on OT role/goals/POC    Patient left up in chair with all lines intact and daughter present    GOALS:   Multidisciplinary Problems       Occupational Therapy Goals          Problem: Occupational Therapy    Goal Priority Disciplines Outcome Interventions   Occupational Therapy Goal     OT, PT/OT Ongoing, Progressing    Description: ST. UB dressing SBA.  2. Grooming in standing with RW with SBA.  3. Toileting, toilet t/f SBA with RW.                       History:     History reviewed. No pertinent past medical history.    History reviewed. No pertinent surgical history.    Time Tracking:     OT Date of Treatment: 22  OT Start Time: 926  OT Stop Time: 945  OT Total Time (min): 19 min    Billable Minutes:Evaluation mod    2022

## 2022-09-22 NOTE — PROGRESS NOTES
Patient doing okay this morning.  Her daughter is at the bedside.  She was originally on 3 L nasal cannula but then tried to get up to go to the bathroom and the exertion started dropping her oxygen saturation into the 70s.  She has bumped up to 4 L and now in the upper 80s.  Patient is alert.  She can tell she is in the hospital and she knows who her daughter is.  We discussed a plan to continue IV Zosyn and Pulmonary is following along.  She is currently NPO due to concern for choking episodes.    Walked 10 and the daughter is about to place her back on the commode.  She wanted to show me what her mother has coughed up a chunk.    Reiterated again what we spent yesterday afternoon doing:  The daughter is at peace now she understands that the abnormality on the x-ray is not fixable.  We will be joining her mother up just enough when she goes back to her baseline and the home under comfort measures and my opinion.

## 2022-09-22 NOTE — PLAN OF CARE
Received call from Courtney with [facility]. She has spoken to daughter and she is agreeable to hospice services.

## 2022-09-22 NOTE — PLAN OF CARE
Spoke to daughter at bedside about hospice (informational visit). [facility] of choice obtained. Referral sent to [facility] and spoke to Courtney.

## 2022-09-22 NOTE — PLAN OF CARE
Problem: Occupational Therapy  Goal: Occupational Therapy Goal  Description: ST. UB dressing SBA.  2. Grooming in standing with RW with SBA.  3. Toileting, toilet t/f SBA with RW.  Outcome: Ongoing, Progressing

## 2022-09-22 NOTE — PROGRESS NOTES
Inpatient Nutrition Assessment    Admit Date: 9/18/2022   Total duration of encounter: 4 days     Nutrition Recommendation/Prescription     Oral diet as tolerated, consistency per SLP.  Boost Glucose Control (provides 250 kcal, 14 g protein per serving) as tolerated, thickened appropriately.    Communication of Recommendations: reviewed with patient/caregiver and reviewed with nurse    Nutrition Assessment     Malnutrition Assessment/Nutrition-Focused Physical Exam    Malnutrition in the context of acute illness or injury  Degree of Malnutrition: non-severe (moderate) malnutrition  Energy Intake: </= 50% of estimated energy requirement for >/= 5 days  Interpretation of Weight Loss: 5% In 1 month  Body Fat:mild depletion  Area of Body Fat Loss: orbital region  and upper arm region - triceps / biceps  Muscle Mass Loss: mild depletion  Area of Muscle Mass Loss: clavicle bone region - pectoralis major, deltoid, trapezius muscles  Fluid Accumulation: does not meet criteria  Edema: not applicable   Reduced  Strength: unable to obtain  A minimum of two characteristics is recommended for diagnosis of either severe or non-severe malnutrition.    Chart Review    Reason Seen: physician consult    Diagnosis:  Staphylococcal sepsis  Left lower lobe bacterial pneumonia  Large left hiatal hernia  Acute hypoxic respiratory failure  Chronic obstructive pulmonary disease  Metabolic acidosis  Acute kidney injury versus chronic kidney disease  Non-insulin-dependent type 2 diabetes it is mellitus   Essential hypertension  Glaucoma  Valvular heart disease   Grade 1 diastolic dysfunction  Alzheimer's dementia     Relevant Medical History: hypertension, asthma, glaucoma, dementia, diabetes mellitus type 2 and valvular heart disease    Nutrition-Related Medications: docusate, methylprednisolone, pantoprazole, prn insulin  Calorie Containing IV Medications: no significant kcals from medications at this time    Nutrition-Related  "Labs:  GFR 45, glucose 291    Diet/PN Order: DIET MODERATELY THICK LIQUIDS ONLY  Oral Supplement Order: Boost Glucose Control  Tube Feeding Order: none at this time  Appetite/Oral Intake: fair/25-50% of meals  Factors Affecting Nutritional Intake: impaired cognitive status/motor control, decreased appetite, and difficulty/impaired swallowing  Food/Latter day/Cultural Preferences: none reported       Wound(s):  none noted    Comments     Consult received for oral supplement. Noted plans for possible discharge with hospice, receiving pleasure feedings with thickened liquids.    Anthropometrics    Height: 5' 8" (172.7 cm)    Last Weight: 71.5 kg (157 lb 10.1 oz) (22 1350) Weight Method: Bed Scale  BMI (Calculated): 24  BMI Classification: normal (BMI 18.5-24.9)     Ideal Body Weight (IBW), Female: 140 lb     % Ideal Body Weight, Female (lb): 112.59 %                    Usual Body Weight (UBW), k.84 kg  % Usual Body Weight: 95.74  % Weight Change From Usual Weight: -4.46 %  Usual Weight Provided By: family/caregiver, weight loss over the past week or so per patient's family    Wt Readings from Last 5 Encounters:   22 71.5 kg (157 lb 10.1 oz)     Weight Change(s) Since Admission:  Admit Weight: 78.9 kg (174 lb) (22 1201)  71.5 kg (22)    Estimated Needs    Weight Used For Calorie Calculations: 71.5 kg (157 lb 10.1 oz)  Energy Calorie Requirements (kcal): 1693 kcal/d, 1.4 stress factor  [facility]  Weight Used For Protein Calculations: 71.5 kg (157 lb 10.1 oz)  Protein Requirements: 86 g/d, 1.2 g/kg  Fluid Requirements (mL): 1693 ml/d, 1 ml/kcal  Temp: 97.7 °F (36.5 °C)       Enteral Nutrition    Patient not receiving enteral nutrition at this time.    Parenteral Nutrition    Patient not receiving parenteral nutrition support at this time.    Evaluation of Received Nutrient Intake    Calories: not meeting estimated needs  Protein: not meeting estimated " needs    Patient Education    Not applicable.    Nutrition Diagnosis     PES: Malnutrition related to dementia as evidenced by </=50% needs met >/=5 days, mild fat depletion, mild muscle depletion, and 5% weight loss in 1 month. (new)    Interventions/Goals     Intervention(s): collaboration with other providers  Goal: Meet greater than 75% of nutritional needs by follow-up. (new)    Monitoring & Evaluation     Dietitian will monitor food and beverage intake.  Nutrition Risk/Follow-Up: high (follow-up in 1-4 days)

## 2022-09-23 VITALS
SYSTOLIC BLOOD PRESSURE: 172 MMHG | HEART RATE: 81 BPM | DIASTOLIC BLOOD PRESSURE: 110 MMHG | BODY MASS INDEX: 23.89 KG/M2 | TEMPERATURE: 98 F | HEIGHT: 68 IN | WEIGHT: 157.63 LBS | RESPIRATION RATE: 20 BRPM | OXYGEN SATURATION: 100 %

## 2022-09-23 LAB
ABS NEUT (OLG): 6.8 X10(3)/MCL (ref 2.1–9.2)
ANION GAP SERPL CALC-SCNC: 10 MEQ/L
BACTERIA BLD CULT: NORMAL
BUN SERPL-MCNC: 26.3 MG/DL (ref 9.8–20.1)
CALCIUM SERPL-MCNC: 8.8 MG/DL (ref 8.4–10.2)
CHLORIDE SERPL-SCNC: 107 MMOL/L (ref 98–107)
CO2 SERPL-SCNC: 23 MMOL/L (ref 23–31)
CREAT SERPL-MCNC: 1.08 MG/DL (ref 0.55–1.02)
CREAT/UREA NIT SERPL: 24
ERYTHROCYTE [DISTWIDTH] IN BLOOD BY AUTOMATED COUNT: 14.6 % (ref 11.5–17)
GFR SERPLBLD CREATININE-BSD FMLA CKD-EPI: 50 MLS/MIN/1.73/M2
GLUCOSE SERPL-MCNC: 362 MG/DL (ref 82–115)
HCT VFR BLD AUTO: 35.8 % (ref 37–47)
HGB BLD-MCNC: 11.3 GM/DL (ref 12–16)
IMM GRANULOCYTES # BLD AUTO: 0.08 X10(3)/MCL (ref 0–0.04)
IMM GRANULOCYTES NFR BLD AUTO: 1 %
INSTRUMENT WBC (OLG): 8 X10(3)/MCL
LYMPHOCYTES NFR BLD MANUAL: 0.72 X10(3)/MCL
LYMPHOCYTES NFR BLD MANUAL: 9 %
MAGNESIUM SERPL-MCNC: 2.4 MG/DL (ref 1.6–2.6)
MCH RBC QN AUTO: 28.9 PG (ref 27–31)
MCHC RBC AUTO-ENTMCNC: 31.6 MG/DL (ref 33–36)
MCV RBC AUTO: 91.6 FL (ref 80–94)
MONOCYTES NFR BLD MANUAL: 0.56 X10(3)/MCL (ref 0.1–1.3)
MONOCYTES NFR BLD MANUAL: 7 %
MYELOCYTES NFR BLD MANUAL: 1 %
NEUTROPHILS NFR BLD MANUAL: 84 %
NRBC BLD AUTO-RTO: 0 %
PLATELET # BLD AUTO: 224 X10(3)/MCL (ref 130–400)
PLATELET # BLD EST: NORMAL 10*3/UL
PMV BLD AUTO: 10.8 FL (ref 7.4–10.4)
POCT GLUCOSE: 289 MG/DL (ref 70–110)
POCT GLUCOSE: 355 MG/DL (ref 70–110)
POTASSIUM SERPL-SCNC: 3.7 MMOL/L (ref 3.5–5.1)
RBC # BLD AUTO: 3.91 X10(6)/MCL (ref 4.2–5.4)
RBC MORPH BLD: NORMAL
SODIUM SERPL-SCNC: 140 MMOL/L (ref 136–145)
WBC # SPEC AUTO: 8.3 X10(3)/MCL (ref 4.5–11.5)

## 2022-09-23 PROCEDURE — 93010 ELECTROCARDIOGRAM REPORT: CPT | Mod: GW,,, | Performed by: INTERNAL MEDICINE

## 2022-09-23 PROCEDURE — 93010 EKG 12-LEAD: ICD-10-PCS | Mod: GW,,, | Performed by: INTERNAL MEDICINE

## 2022-09-23 PROCEDURE — 25000003 PHARM REV CODE 250: Performed by: INTERNAL MEDICINE

## 2022-09-23 PROCEDURE — 83735 ASSAY OF MAGNESIUM: CPT | Performed by: NURSE PRACTITIONER

## 2022-09-23 PROCEDURE — 25000242 PHARM REV CODE 250 ALT 637 W/ HCPCS: Performed by: INTERNAL MEDICINE

## 2022-09-23 PROCEDURE — 63600175 PHARM REV CODE 636 W HCPCS: Performed by: PHYSICIAN ASSISTANT

## 2022-09-23 PROCEDURE — 63600175 PHARM REV CODE 636 W HCPCS: Performed by: INTERNAL MEDICINE

## 2022-09-23 PROCEDURE — 97116 GAIT TRAINING THERAPY: CPT | Mod: CQ

## 2022-09-23 PROCEDURE — 36415 COLL VENOUS BLD VENIPUNCTURE: CPT | Performed by: NURSE PRACTITIONER

## 2022-09-23 PROCEDURE — 63600175 PHARM REV CODE 636 W HCPCS: Performed by: HOSPITALIST

## 2022-09-23 PROCEDURE — 97530 THERAPEUTIC ACTIVITIES: CPT | Mod: CQ

## 2022-09-23 PROCEDURE — 25000003 PHARM REV CODE 250

## 2022-09-23 PROCEDURE — 80048 BASIC METABOLIC PNL TOTAL CA: CPT | Performed by: NURSE PRACTITIONER

## 2022-09-23 PROCEDURE — 85025 COMPLETE CBC W/AUTO DIFF WBC: CPT | Performed by: NURSE PRACTITIONER

## 2022-09-23 PROCEDURE — 94640 AIRWAY INHALATION TREATMENT: CPT

## 2022-09-23 PROCEDURE — 93005 ELECTROCARDIOGRAM TRACING: CPT

## 2022-09-23 PROCEDURE — 99900035 HC TECH TIME PER 15 MIN (STAT)

## 2022-09-23 PROCEDURE — 27000221 HC OXYGEN, UP TO 24 HOURS

## 2022-09-23 RX ORDER — ENOXAPARIN SODIUM 100 MG/ML
30 INJECTION SUBCUTANEOUS EVERY 12 HOURS
Status: DISCONTINUED | OUTPATIENT
Start: 2022-09-23 | End: 2022-09-23 | Stop reason: HOSPADM

## 2022-09-23 RX ORDER — PREDNISONE 20 MG/1
20 TABLET ORAL 2 TIMES DAILY
Qty: 10 TABLET | Refills: 0 | Status: SHIPPED | OUTPATIENT
Start: 2022-09-23 | End: 2022-09-28

## 2022-09-23 RX ORDER — AMOXICILLIN AND CLAVULANATE POTASSIUM 875; 125 MG/1; MG/1
1 TABLET, FILM COATED ORAL EVERY 12 HOURS
Qty: 10 TABLET | Refills: 0 | Status: SHIPPED | OUTPATIENT
Start: 2022-09-23 | End: 2022-09-28

## 2022-09-23 RX ORDER — AMOXICILLIN AND CLAVULANATE POTASSIUM 875; 125 MG/1; MG/1
1 TABLET, FILM COATED ORAL EVERY 12 HOURS
Status: DISCONTINUED | OUTPATIENT
Start: 2022-09-23 | End: 2022-09-23 | Stop reason: HOSPADM

## 2022-09-23 RX ORDER — MORPHINE SULFATE 4 MG/ML
1 INJECTION, SOLUTION INTRAMUSCULAR; INTRAVENOUS ONCE
Status: COMPLETED | OUTPATIENT
Start: 2022-09-23 | End: 2022-09-23

## 2022-09-23 RX ORDER — METOPROLOL TARTRATE 1 MG/ML
5 INJECTION, SOLUTION INTRAVENOUS ONCE
Status: COMPLETED | OUTPATIENT
Start: 2022-09-23 | End: 2022-09-23

## 2022-09-23 RX ADMIN — GUAIFENESIN AND CODEINE PHOSPHATE 10 ML: 100; 10 SOLUTION ORAL at 02:09

## 2022-09-23 RX ADMIN — CLINDAMYCIN IN 5 PERCENT DEXTROSE 600 MG: 12 INJECTION, SOLUTION INTRAVENOUS at 11:09

## 2022-09-23 RX ADMIN — INSULIN ASPART 6 UNITS: 100 INJECTION, SOLUTION INTRAVENOUS; SUBCUTANEOUS at 12:09

## 2022-09-23 RX ADMIN — DOCUSATE SODIUM 100 MG: 100 CAPSULE, LIQUID FILLED ORAL at 08:09

## 2022-09-23 RX ADMIN — MEMANTINE 10 MG: 5 TABLET ORAL at 08:09

## 2022-09-23 RX ADMIN — ENOXAPARIN SODIUM 30 MG: 30 INJECTION SUBCUTANEOUS at 11:09

## 2022-09-23 RX ADMIN — ACETAMINOPHEN 650 MG: 325 TABLET ORAL at 02:09

## 2022-09-23 RX ADMIN — IPRATROPIUM BROMIDE AND ALBUTEROL SULFATE 3 ML: 2.5; .5 SOLUTION RESPIRATORY (INHALATION) at 11:09

## 2022-09-23 RX ADMIN — PIPERACILLIN AND TAZOBACTAM 4.5 G: 4; .5 INJECTION, POWDER, LYOPHILIZED, FOR SOLUTION INTRAVENOUS; PARENTERAL at 04:09

## 2022-09-23 RX ADMIN — GUAIFENESIN AND CODEINE PHOSPHATE 10 ML: 100; 10 SOLUTION ORAL at 01:09

## 2022-09-23 RX ADMIN — IPRATROPIUM BROMIDE AND ALBUTEROL SULFATE 3 ML: 2.5; .5 SOLUTION RESPIRATORY (INHALATION) at 09:09

## 2022-09-23 RX ADMIN — HYDRALAZINE HYDROCHLORIDE 10 MG: 20 INJECTION INTRAMUSCULAR; INTRAVENOUS at 05:09

## 2022-09-23 RX ADMIN — FLUTICASONE FUROATE, UMECLIDINIUM BROMIDE AND VILANTEROL TRIFENATATE 1 PUFF: 100; 62.5; 25 POWDER RESPIRATORY (INHALATION) at 09:09

## 2022-09-23 RX ADMIN — ACETAMINOPHEN 650 MG: 325 TABLET ORAL at 05:09

## 2022-09-23 RX ADMIN — CLINDAMYCIN IN 5 PERCENT DEXTROSE 600 MG: 12 INJECTION, SOLUTION INTRAVENOUS at 03:09

## 2022-09-23 RX ADMIN — ACETAMINOPHEN 650 MG: 325 TABLET ORAL at 08:09

## 2022-09-23 RX ADMIN — OXYCODONE 5 MG: 5 TABLET ORAL at 01:09

## 2022-09-23 RX ADMIN — INSULIN ASPART 10 UNITS: 100 INJECTION, SOLUTION INTRAVENOUS; SUBCUTANEOUS at 05:09

## 2022-09-23 RX ADMIN — ACETAMINOPHEN 650 MG: 325 TABLET ORAL at 11:09

## 2022-09-23 RX ADMIN — MORPHINE SULFATE 1 MG: 4 INJECTION INTRAVENOUS at 09:09

## 2022-09-23 RX ADMIN — METOPROLOL TARTRATE 5 MG: 5 INJECTION, SOLUTION INTRAVENOUS at 08:09

## 2022-09-23 RX ADMIN — ALPRAZOLAM 0.25 MG: 0.25 TABLET ORAL at 08:09

## 2022-09-23 RX ADMIN — PANTOPRAZOLE SODIUM 40 MG: 40 TABLET, DELAYED RELEASE ORAL at 08:09

## 2022-09-23 RX ADMIN — METHYLPREDNISOLONE SODIUM SUCCINATE 60 MG: 40 INJECTION, POWDER, FOR SOLUTION INTRAMUSCULAR; INTRAVENOUS at 05:09

## 2022-09-23 RX ADMIN — LEVOTHYROXINE SODIUM 75 MCG: 25 TABLET ORAL at 08:09

## 2022-09-23 RX ADMIN — GUAIFENESIN AND CODEINE PHOSPHATE 10 ML: 100; 10 SOLUTION ORAL at 08:09

## 2022-09-23 NOTE — PT/OT/SLP PROGRESS
POC discussed with nursing, case management, and attending MD.  Pt to d/c with hospice care with pleasure feeds today.  Skilled SLP services no longer warranted, please reconsult as needed.

## 2022-09-23 NOTE — PT/OT/SLP PROGRESS
Physical Therapy  Treatment    Mena Conti   MRN: [id number]   Admitting Diagnosis: Pneumonia of left lower lobe due to infectious organism                   PTA Visit Number: 0       General Precautions: Standard, fall  Orthopedic Precautions: N/A   Braces:    Respiratory Status: Nasal cannula, flow 5 L/min    Spiritual, Cultural Beliefs, Jainism Practices, Values that Affect Care: no    Subjective:  Communicated with NSG prior to session.         Objective:        Functional Mobility:  Bed Mobility:       Transfers:   Sit to/from stand x  2 bouts. Min A. RW    Gait:   Pt ambulated ~20ft x 2 bouts. O2 sats in the 80s t/o gait. Poor readings on the O2 probe though pt demos labored breathing. RW.    AM-PAC 6 CLICK MOBILITY  How much help from another person does this patient currently need?   1 = Unable, Total/Dependent Assistance  2 = A lot, Maximum/Moderate Assistance  3 = A little, Minimum/Contact Guard/Supervision  4 = None, Modified [place]/Independent         AM-PAC Raw Score CMS G-Code Modifier Level of Impairment Assistance   6 % Total / Unable   7 - 9 CM 80 - 100% Maximal Assist   10 - 14 CL 60 - 80% Moderate Assist   15 - 19 CK 40 - 60% Moderate Assist   20 - 22 CJ 20 - 40% Minimal Assist   23 CI 1-20% SBA / CGA   24 CH 0% Independent/ Mod I     Patient left up in chair with all lines intact and call button in reach.    Assessment:  Mena Conti is a 87 y.o. female with a medical diagnosis of Pneumonia of left lower lobe due to infectious organism     Rehab identified problem list/impairments:      Rehab potential is excellent.    Activity tolerance: Excellent    Discharge recommendations:       Barriers to discharge:      Equipment recommendations:       GOALS:   Multidisciplinary Problems       Physical Therapy Goals          Problem: Physical Therapy    Goal Priority Disciplines Outcome Goal Variances Interventions   Physical Therapy Goal     PT, PT/OT Ongoing, Progressing      Description: Goals to be met by: 10/21/22     Patient will increase functional independence with mobility by performin. Supine to sit with Stand-by Assistance  2. Sit to supine with Stand-by Assistance  3. Sit to stand transfer with Supervision  4. Gait  x 200 feet with Contact Guard Assistance using Rolling Walker.                          PLAN:    Patient to be seen 5 x/week  to address the above listed problems via gait training, therapeutic activities, therapeutic exercises, neuromuscular re-education  Plan of Care expires: 10/21/22  Plan of Care reviewed with: patient, family         2022

## 2022-09-23 NOTE — PLAN OF CARE
Problem: Coping Ineffective  Goal: Effective Coping  Outcome: Ongoing, Progressing  Intervention: Support and Enhance Coping Strategies  Flowsheets (Taken 9/22/2022 1930)  Supportive Measures:   active listening utilized   decision-making supported   goal-setting facilitated   self-care encouraged   verbalization of feelings encouraged  Family/Support System Care:   caregiver stress acknowledged   involvement promoted   presence promoted   self-care encouraged   support provided  Environmental Support: calm environment promoted

## 2022-09-23 NOTE — PLAN OF CARE
09/23/22 1204   Final Note   Assessment Type Final Discharge Note   [facility]   Post-Acute Status   Post-Acute Authorization Hospice   Hospice Status Set-up Complete/Auth obtained   Spoke to Courtney with [facility]. Equipment has been delivered. Discharge information sent via [facility]. Patient will transport via [facility].

## 2022-09-23 NOTE — PHYSICIAN QUERY
"PT Name: Mena Conti  MR #: [id number]    DOCUMENTATION CLARIFICATION     CDS/: Nichol Sanchez RN CDI          Contact information:  [email or web address]     This form is a permanent document in the medical record.     Query Date: September 23, 2022    By submitting this query, we are merely seeking further clarification of documentation.. Please utilize your independent clinical judgment when addressing the question(s) below.    The medical record contains the following:   Indicators  Supporting Clinical Findings Location in Medical Record   X Energy Intake Energy Intake: </= 50% of estimated energy requirement for >/= 5 days   Nutrition 9/22 [address]   X Weight Loss Interpretation of Weight Loss: 5% In 1 month   Nutrition 9/22 [address]   X Fat Loss Body Fat:mild depletion  Area of Body Fat Loss: orbital region  and upper arm region - triceps / biceps   Nutrition 9/22 [address]   X Muscle Loss Muscle Mass Loss: mild depletion  Area of Muscle Mass Loss: clavicle bone region - pectoralis major, deltoid, trapezius muscles   Nutrition 9/22 [address]    Edema/Fluid Accumulation      Reduced  Strength (by dynamometer)     X Weight, BMI, Usual Body Weight Height 5'8"  Weight 71.5 kg (157 lb 10.1 oz) (09/22/22 1350)   BMI 24  Admit weight 78.9 kg    Usual weight 74.84 kg   Nutrition 9/22 [address]    Delayed Wound Healing     X Registered Dietician Diagnosis Malnutrition in the context of acute illness or injury  Degree of Malnutrition: non-severe (moderate) malnutrition   Nutrition 9/22 [address]   X Acute or Chronic Illness Community-acquired pneumonia - left  Left pleural effusion  Macrocytic anemia  TRELL versus CKD stage 3  Hyperkalemia  Lactic acidosis  Essential hypertension  Diabetes mellitus type 2 with hyperglycemia  Dementia   HMed 9/18 AMANDA COMER/ T Kristan JARAMILLO    Social or Environmental Circumstances     X Treatment Oral diet as tolerated, consistency per SLP.  Boost Glucose Control " (provides 250 kcal, 14 g protein per serving) as tolerated, thickened appropriately.   Nutrition 9/22 EDUARDO Kim       Academy of Nutrition and Dietetics (Academy) and the American Society for Parenteral and Enteral Nutrition (A.S.P.E.N.) Clinical Characteristics to support Malnutrition   Malnutrition in the Context of Acute Illness or Injury Malnutrition in the Context of Chronic Illness or Injury Malnutrition in the Context of Social or Environmental Circumstances   Malnutrition Level Moderate Severe Moderate Severe   Moderate   Severe   Energy Intake <75%                   >7 days <50%                 >5 days <75%           >1 month <75%                      >1 month   <75% for >3 months   <50% for >1 month   Weight Loss   1-2% in 1 week >2% in 1 week 5% in 1 month >5% in 1 month 5% in 1 month >5% in 1 month    5% in 1 month >5% in 1 month 7.5% in 3 months >7.5% in 3 months 7.5% in 3 months >7.5% in 3 months    7.5% in 3 months >7.5% in 3 months 10% in 6 months >10% in 6 months 10% in 6 months >10% in 6 months        20% in 1 year                    >20% in 1 year                                                                  20% in 1 year                            >20% in 1 year                                                  Subcutaneous Fat Loss Mild  Moderate  Mild  Severe    Mild   Severe   Muscle Loss Mild depletion Moderate depletion  Mild depletion Severe Depletion   Mild   Severe   Edema/Fluid Accumulation Mild Moderate to severe  Mild  Severe   Mild   Severe   Reduced  Strength         (based on standards supplied by  of dynamometer) N/A Measurably reduced N/A Measurably reduced N/A Measurably reduced     Criteria for mild malnutrition is defined as 1 characteristic outlined above within the established moderate or severe parameters.  A minimum of 2 out of the 6 characteristics noted above are recommended for a diagnosis of moderate or severe malnutrition.  Chronic  illness/injury is a disease/condition lasting 3 months or longer.    The noted clinical guidelines are only system guidelines and do not replace the providers clinical judgment.    Provider, please specify diagnosis or diagnoses associated with above clinical findings.    [  ] Mild Malnutrition - 1 characteristic outlined above within the established moderate or severe parameters     [ x ] Moderate Malnutrition - a minimum of 2 of the 6 moderate malnutrition characteristics noted above      [  ] Other Nutritional Diagnosis (please specify): _______     [  ] Malnutrition ruled out     [  ] Clinically Undetermined     Please document in your progress notes daily for the duration of treatment until resolved and  include in your discharge summary.      References:    SIMEON Cano, & LARISA Munoz (2022, April). Assessment and management of anorexia and cachexia in palliative care. Retrieved May 23, 2022, from [email or web address]     GRETTA Medrano, PhD, RD, Moises MCKOY P., PhD, RN, JES No MD, PhD, Neelam AVALOS A., MS, RD, [facility], CAMILA Nicole, MS, RD, The Academy Malnutrition Work Group, The A.S.P.E.N. Board of Directors. (2012). Consensus Statement: Academy of Nutrition and Dietetics and American Society for Parenteral and Enteral Nutrition: Characteristics Recommended for the Identification and Documentation of Adult Malnutrition (Undernutrition). Journal of Parenteral and Enteral Nutrition, 36(3), 275-283. doi:10.1177/9763238305918955     Form No. 80483

## 2022-09-23 NOTE — CONSULTS
Consults  Patient Name: Mena Conti   MRN: [id number]   Admission Date: 9/18/2022   Hospital Length of Stay: 5   Attending Provider: Epi Donis MD   Consulting Provider: Zuly Forrester RN  Reason for Consult: Goals of Care  Primary Care Physician:  Waqas Vincent Sr, MD     Principal Problem: Pneumonia of left lower lobe due to infectious organism       Final diagnoses:  [S09.8XXA] Blunt head trauma  [A41.9] Sepsis, due to unspecified organism, unspecified whether acute organ dysfunction present  [J18.9] Pneumonia of left lower lobe due to infectious organism (Primary)  [R09.02] Hypoxia      Assessment/Plan:     I reviewed the patient and family's understanding of the seriousness of the illness and its expected prognosis. We discussed the patient's goals of care and treatment preferences.        I met with Mrs. San and her daughter Pia in the patient's room. Mrs. San is seated in the recliner with her feet on the floor with oxymask in place. She nods yes when asked if she's having trouble breathing. Discussed with Pia the use of Morphine to reduce the work of breathing. Pia is in favor of patient receiving Morphine to ease symptoms. Pia reports that when she spoke with the doctor this morning she wants to move to comfort focused treatments. Offered support to Pia and the patient. Signed LaPOST placed on patient's chart. A copy of the signed LaPOST is placed on the chart and a copy given the the CM for transport to [facility] will continue to follow to assist with GOC                                                                                    Recommendations:     Continue comfort focused interventions.      Interval History:     Primary nurse is assessing vital signs which have been notable for tachycardia in the 160's and elevated BP with the last two readings being 186/99 and 157/88. Respirations have ranged from 19 - 28 per minute with SpO2 readings in the  low 90's. Palliative will continue to follow.      Active Ambulatory Problems     Diagnosis Date Noted    No Active Ambulatory Problems     Resolved Ambulatory Problems     Diagnosis Date Noted    No Resolved Ambulatory Problems     No Additional Past Medical History        History reviewed. No pertinent surgical history.     Review of patient's allergies indicates:  No Known Allergies       Current Facility-Administered Medications:     acetaminophen tablet 650 mg, 650 mg, Oral, Q4H, Good Johnston MD, 650 mg at 09/23/22 0815    acetaminophen tablet 650 mg, 650 mg, Oral, Q4H PRN, Patt Lauren PA-C, 650 mg at 09/22/22 1004    albuterol-ipratropium 2.5 mg-0.5 mg/3 mL nebulizer solution 3 mL, 3 mL, Nebulization, Q4H WAKE, Primitivo Giles MD, 3 mL at 09/23/22 0903    albuterol-ipratropium 2.5 mg-0.5 mg/3 mL nebulizer solution 3 mL, 3 mL, Nebulization, Q4H PRN, Jelani Tubbs MD, 3 mL at 09/22/22 0333    ALPRAZolam tablet 0.25 mg, 0.25 mg, Oral, TID PRN, Primitivo Giles MD, 0.25 mg at 09/23/22 0817    benzonatate capsule 200 mg, 200 mg, Oral, TID PRN, Jelani Tubbs MD, 200 mg at 09/21/22 0821    calcium carbonate 200 mg calcium (500 mg) chewable tablet 1,000 mg, 1,000 mg, Oral, TID PRN, Good Johnston MD    clindamycin in D5W 600 mg/50 mL IVPB 600 mg, 600 mg, Intravenous, Q8H, Jelani Tubbs MD, Stopped at 09/23/22 0354    dextrose 10 % infusion, 12.5 g, Intravenous, PRN, Patt Lauren PA-C    dextrose 10% bolus 250 mL, 25 g, Intravenous, PRN, Patt Lauren PA-C    docusate sodium capsule 100 mg, 100 mg, Oral, BID, Good Johnston MD, 100 mg at 09/23/22 0817    donepeziL tablet 10 mg, 10 mg, Oral, QHS, Jelani Tubbs MD, 10 mg at 09/22/22 2129    enoxaparin injection 30 mg, 30 mg, Subcutaneous, Daily, Patt Lauren PA-C, 30 mg at 09/21/22 1614    fluticasone-umeclidin-vilanter 100-62.5-25 mcg DsDv 1 puff, 1 puff, Inhalation, Daily, Jelani Tbubs MD, 1 puff at  09/23/22 0904    glucagon (human recombinant) injection 1 mg, 1 mg, Intramuscular, PRN, Patt Lauren PA-C    glucose chewable tablet 16 g, 16 g, Oral, PRN, Patt Lauren PA-C    glucose chewable tablet 24 g, 24 g, Oral, PRN, Patt Lauren PA-C    guaiFENesin 100 mg/5 ml syrup 200 mg, 200 mg, Oral, Q4H PRN, Marleen Mckeon, MARISSA, 200 mg at 09/22/22 1004    guaiFENesin-codeine 100-10 mg/5 ml syrup 10 mL, 10 mL, Oral, Q6H, Primitivo Giles MD, 10 mL at 09/23/22 0815    hydrALAZINE injection 10 mg, 10 mg, Intravenous, Q4H PRN, Epi Donis MD, 10 mg at 09/23/22 0549    insulin aspart U-100 injection 1-10 Units, 1-10 Units, Subcutaneous, QID (AC + HS) PRN, Patt Lauren PA-C, 10 Units at 09/23/22 0538    labetaloL injection 10 mg, 10 mg, Intravenous, Q4H PRN, Epi Donis MD    levothyroxine tablet 75 mcg, 75 mcg, Oral, Daily, Jelani Tubbs MD, 75 mcg at 09/23/22 0817    magnesium hydroxide 400 mg/5 ml suspension 2,400 mg, 30 mL, Oral, Daily PRN, Good Johnston MD    melatonin tablet 6 mg, 6 mg, Oral, Nightly PRN, Good Johnston MD    memantine tablet 10 mg, 10 mg, Oral, BID, Jelani Tubbs MD, 10 mg at 09/23/22 0815    methylPREDNISolone sodium succinate injection 60 mg, 60 mg, Intravenous, Q8H, Primitivo Giles MD, 60 mg at 09/23/22 0528    mirtazapine tablet 7.5 mg, 7.5 mg, Oral, QHS, Caridad Roa MD, 7.5 mg at 09/22/22 2130    montelukast tablet 10 mg, 10 mg, Oral, QHS, Jelani Tubbs MD, 10 mg at 09/22/22 2130    morphine injection 1 mg, 1 mg, Intravenous, Once, Jocelynn Iniguez MD    ondansetron injection 4 mg, 4 mg, Intravenous, Q6H PRN, Patt Lauren PA-C    oxyCODONE immediate release tablet 10 mg, 10 mg, Oral, Q4H PRN, Good Johnston MD    oxyCODONE immediate release tablet 5 mg, 5 mg, Oral, Q4H PRN, Good Johnston MD    pantoprazole EC tablet 40 mg, 40 mg, Oral, Daily, Jelani Tubbs MD, 40 mg at 09/23/22 0817     acetaminophen,  "albuterol-ipratropium, ALPRAZolam, benzonatate, calcium carbonate, dextrose 10 % in water (D10W), dextrose 10%, glucagon (human recombinant), glucose, glucose, guaiFENesin 100 mg/5 ml, hydrALAZINE, insulin aspart U-100, labetalol, magnesium hydroxide 400 mg/5 ml, melatonin, ondansetron, oxyCODONE, oxyCODONE     History reviewed. No pertinent family history.       Review of Systems   Unable to perform ROS: Mental status change          Objective:   BP (!) 157/88   Pulse 87   Temp 97.5 °F (36.4 °C) (Oral)   Resp (!) 24   Ht 5' 8" (1.727 m)   Wt 71.5 kg (157 lb 10.1 oz)   SpO2 (!) 91%   Breastfeeding No   BMI 23.97 kg/m²      Physical Exam   HENT:   Head: Normocephalic.   Right Ear: External ear normal.   Left Ear: External ear normal.   Nose: Nose normal.   Mouth/Throat: Mucous membranes are dry.   Eyes: Pupils are equal, round, and reactive to light.   Cardiovascular: Tachycardia present. Pulmonary:      Effort: Pulmonary effort is normal.     Abdominal: Soft.   Neurological: She is alert.   Oriented to person   Skin: Skin is warm and dry.        Review of Symptoms  Review of Symptoms      Symptom Assessment (ESAS 0-10 Scale)  Pain:  0  Dyspnea:  0  Anxiety:  0  Nausea:  0  Depression:  0  Anorexia:  0  Fatigue:  0  Insomnia:  0  Restlessness:  0  Agitation:  0         Living Arrangements:  Lives with friend and Lives in home     Time-Based Charting:  No    Advance Care Planning   Advance Directives:   LaPOST: Yes    Do Not Resuscitate Status: Yes      Decision Making:  Family answered questions        PAINAD: N/A    Caregiver burden formerly assessed: yes      No results displayed because visit has over 200 results.               Zuly Forrester RN  Palliative Medicine  [facility]    "

## 2022-09-23 NOTE — PHYSICIAN QUERY
PT Name: Mena Conti  MR #: [id number]     DOCUMENTATION CLARIFICATION     CDS/: Nichol Sanchez RN CDI          Contact information: [email or web address]   This form is a permanent document in the medical record.    Query Date: September 23, 2022    By submitting this query, we are merely seeking further clarification of documentation.  Please utilize your independent clinical judgment when addressing the question(s) below.    The Medical Record contains the following:   Indicator Supporting Clinical Findings Location in Medical Record   X Kidney (Renal) Failure/Injury TRELL versus CKD stage 3 H&P 9/18 AMANDA COMER/   OBI Rushing MD      Nephrotoxic Agents Vancomycin IV 9/19, 9/18, 9/20 Med sheets     X BUN/Creatinine           GFR            BUN    Cr       eGFR  9/18    26.3    1.78    28  9/19    30.3    1.48    35  9/20    31.5    1.30    40  9/33    26.3    1.08    50   Lab    Urine: Casts         Eosinophils      Dehydration      Nausea/Vomiting      Dialysis/CRRT     X Treatment Discontinue Vancomycin and azithromycin.  Continue with Zosyn\IV fluid hydration  Sabinakelma ordered for hyperkalemia.  Continue to monitor electrolytes   H&P 9/18 AMANDA Rushing MD    Other         [facility] approved diagnostic criteria for acute kidney injury is based on KDIGO criteria:    An increase in serum creatinine > 0.3mg/dl within 48 hours  OR  Increase in serum creatinine to > 1.5x baseline, which is known or presumed to have occurred within the prior 7 days  OR  Urine volume <0.5 ml/kg/hr for 6 hours       The clinical guidelines noted above are only a system guideline. It does not replace the providers clinical judgment.     Provider, please specify the diagnosis or diagnoses associated with above clinical findings.       Alexy all that apply;     [    ] Unspecified Acute Kidney Failure/Injury       [    ] Other Acute Kidney Failure/Injury (please specify): ____________       [    ] Chronic Kidney Disease  (CKD) stage 3a - Mildly to moderately decreased eGFR 45-59   [   x ] Chronic Kidney Disease (CKD) stage 3b - Moderately to severely decreased eGFR 30-44   [    ] Chronic Kidney Disease (CKD) stage 3 unspecified     [    ] Other (please specify): _______________________________     [  ] Clinically Undetermined     Please document in your progress notes daily for the duration of treatment until resolved and include in your discharge summary.    References:   KDIGO Clinical Practice Guideline for Acute Kidney Injury. (2012, March). Retrieved October 21, 2020, from https://kdigo.org/wp-content/uploads/2016/10/EQRNA-2616-NHT-Guideline-English.pdf    DAVON Gastelum MD, JES Duval MD, & KELSEY Levin MD. (1960). Renal medullary necrosis [Abstract]. The American Journal of Medicine, 29(1), 132-156. Doi:https://www.sciencedirect.com/science/article/abs/pii/0822489644467235    KELSEY Vázquez MD, & WINTER Tate MD, MS. (2020, June 18). Definition and staging of chronic kidney disease in adults ([id number] JES Dumas MD, ScD & [id number] GRETTA Fagan MD, MSc, Eds.). Retrieved October 21, 2020, from https://www.Mezmeriz.CrossChx/contents/definition-and-staging-of-chronic-kidney-disease-in-adults?search=ckd%20staging&source=search_result&selectedTitle=1~150&usage_type=default&display_rank=1     LARISA Rabago MD, FACP. (2015, Desirae 15). Acute kidney injury revisited. Retrieved October 21, 2020, from https://acphospitalist.org/archives/2015/06/coding-acute-kidney-injury.htm    BLANCA Wellington MD. (2019, July). Renal Cortical Necrosis. Retrieved October 21, 2020, from https://www.Thermedical/professional/genitourinary-disorders/renovascular-disorders/renal-cortical-necrosis    Form No. 83271

## 2022-09-24 LAB
BACTERIA SPEC CULT: ABNORMAL
BACTERIA SPEC CULT: ABNORMAL
GRAM STN SPEC: ABNORMAL

## 2022-09-26 LAB
BACTERIA BLD CULT: NORMAL
BACTERIA BLD CULT: NORMAL

## 2022-10-21 NOTE — DISCHARGE SUMMARY
[facility] Medicine Discharge Summary    Admit Date: 9/18/2022  Discharge Date and Time: 10/21/44164:53 PM  Admitting Physician: SILVIANO Team  Discharging Physician: Jocelynn Iniguez MD.  Primary Care Physician: Primary Doctor No  Consults: Pulmonary/Intensive care    Discharge Diagnoses:  Staphylococcal sepsis  Left lower lobe bacterial pneumonia  Large left hiatal hernia  Acute hypoxic respiratory failure  Chronic obstructive pulmonary disease  Metabolic acidosis  Acute kidney injury versus chronic kidney disease  Non-insulin-dependent type 2 diabetes it is mellitus   Essential hypertension  Glaucoma  Valvular heart disease   Grade 1 diastolic dysfunction  Alzheimer's dementia    Hospital Course:   87 year old  female with a past medical history of hypertension, asthma, glaucoma, dementia, diabetes mellitus type 2 and valvular heart disease. The patient presented to [facility] on 9/18/20[facility] following a fall. Granddaughter who was at bedside states patient told her daughter with whom she lives that she was going to the bathroom. When daughter went to check on her she was found on the ground but awake and alert. Patient denies head injury or loss of consciousness.  Patient has been having a productive cough for the last week.  She presented to her PCP earlier in the week due to blood pressure being elevated at home with a systolic in the 190s and Lyndsay was prescribed.  She began taking Lyndsay approximately 3 days ago.  Patient denies complaints of chest pain, fever, chills, sweats, shortness of breath.  She lives with daughter, ambulates with walker and needs assistance with activities of daily living.     Upon presentation to the ED, patient was febrile with a temperature 100.9° F, blood pressure 105/58 and SpO2 96% on room air.  Patient's blood pressure decreased to 84/53 which responded to IV fluid hydration.  She was placed on supplemental oxygen via nasal cannula which  has since been titrated up to 4 L. labs were notable for WBC 5.3, hemoglobin 11.7, hematocrit 40.3, .8, potassium 5.4, CO2 18, BUN/creatinine 26.3/1.78, glucose 246, lactic acid 5.4.  No prior labs for comparison.  Ethanol level less than 10.  Influenza A and B and SARS-CoV-2 PCR negative.  Chest x-ray with moderate left pleural effusion with basilar airspace opacities.  X-ray of the pelvis without fracture.  CT head without acute intracranial abnormalities.  CT of the cervical spine with asymmetry in the interval between the dens and lateral masses of the C1 appears position with unremarkable alignment of the lateral masses of C1 and C2 and small old avulsed fragment from the right lateral mass of C1.  CT chest, abdomen and pelvis with left basilar consolidative and ground-glass airspace opacity concerning for infection, heterogeneous enhancement of the spleen likely perfusional and a large left hiatal hernia.   Patient currently being managed for left lower lobe pneumonia with hypoxic respiratory failure, Staph hominis bacteremia.  It has a history of COPD and asthma.  Sugar was growing Gram-positive cocci patient's family wanted to take her home with hospice   Pulmonary was consulted and they were following.  Case management was consulted since family elected for hospice patient was discharged home with hospice  Pt was seen and examined on the day of discharge  Vitals:  VITAL SIGNS: 24 HRS MIN & MAX LAST   No data recorded 97.6 °F (36.4 °C)   No data recorded (!) 172/110     No data recorded  81   No data recorded 20   No data recorded 100 %       Physical Exam:  General: In no acute distress, afebrile  Chest: Clear to auscultation bilaterally  Heart: RRR, +S1, S2, no appreciable murmur  Abdomen: Soft, nontender, BS +  MSK: Warm, no lower extremity edema    Procedures Performed: No admission procedures for hospital encounter.     Significant Diagnostic Studies: See Full reports for all details    No  results for input(s): WBC, RBC, HGB, HCT, MCV, MCH, MCHC, RDW, PLT, MPV, GRAN, LYMPH, MONO, BASO, NRBC in the last 168 hours.    No results for input(s): NA, K, CL, CO2, ANIONGAP, BUN, CREATININE, GLU, CALCIUM, PH, MG, ALBUMIN, PROT, ALKPHOS, ALT, AST, BILITOT in the last 168 hours.     Microbiology Results (last 7 days)       Procedure Component Value Units Date/Time    Blood culture #2 **CANNOT BE ORDERED STAT** [[id number]]  (Abnormal)  (Susceptibility) Collected: 09/18/22 1446    Order Status: Completed Specimen: Blood Updated: 09/21/22 0906     CULTURE, BLOOD ([facility]) Staphylococcus hominis subsp. hominis     GRAM STAIN Gram Positive Cocci, probable Staphylococcus      Seen in gram stain of broth only      1 of 1 Aerobic bottle positive             X-Ray Chest 1 View  Narrative: EXAMINATION:  XR CHEST 1 VIEW    CPT 81268    CLINICAL HISTORY:  sob;    COMPARISON:  September 18, 2022    FINDINGS:  Cardiomediastinal silhouette to be unchanged as compared with the previous exam there is some worsening consolidative changes in the left lung which might be related to pleural fluid and or represent an infiltrate/atelectasis    No other interval change  Impression: Worsening consolidative changes in the left lung which might be related to pleural fluid or represent an infiltrate/atelectasis    No other change    Electronically signed by: Josh White  Date:    09/21/2022  Time:    08:20         Medication List        CONTINUE taking these medications      cloNIDine 0.1 MG tablet  Commonly known as: CATAPRES     donepeziL 10 MG tablet  Commonly known as: ARICEPT     furosemide 20 MG tablet  Commonly known as: LASIX     lansoprazole 30 MG capsule  Commonly known as: PREVACID     levothyroxine 75 MCG tablet  Commonly known as: SYNTHROID     memantine 10 MG Tab  Commonly known as: NAMENDA     mirtazapine 15 MG tablet  Commonly known as: REMERON     montelukast 10 mg tablet  Commonly known as: PJ BATRES  12.5-12.5 mg/5 mL Liqd  Generic drug: pyrilamine-chlophedianoL     pravastatin 40 MG tablet  Commonly known as: PRAVACHOL     TRELEGY ELLIPTA 100-62.5-25 mcg Dsdv  Generic drug: fluticasone-umeclidin-vilanter            STOP taking these medications      amlodipine-olmesartan 5-20 mg per tablet  Commonly known as: BECKIE     potassium chloride SA 20 MEQ tablet  Commonly known as: K-DUR,KLOR-CON            ASK your doctor about these medications      amoxicillin-clavulanate 875-125mg 875-125 mg per tablet  Commonly known as: AUGMENTIN  Take 1 tablet by mouth every 12 (twelve) hours. for 5 days  Ask about: Should I take this medication?     predniSONE 20 MG tablet  Commonly known as: DELTASONE  Take 1 tablet (20 mg total) by mouth 2 (two) times daily. for 5 days  Ask about: Should I take this medication?               Where to Get Your Medications        These medications were sent to [facility] - [address]  [address] [place] [address] Floor 1[facility] [address]      Phone: [phone]   amoxicillin-clavulanate 875-125mg 875-125 mg per tablet  predniSONE 20 MG tablet          Explained in detail to the patient about the discharge plan, medications, and follow-up visits. Pt understands and agrees with the treatment plan  Discharge Disposition: Hospice/Home   Discharged Condition: stable  Diet-    Medications Per DC med rec  Activities as tolerated    For further questions contact hospitalist office    Discharge time 33 minutes    For worsening symptoms, chest pain, shortness of breath, increased abdominal pain, high grade fever, stroke or stroke like symptoms, immediately go to the nearest Emergency Room or call 911 as soon as possible.      Jocelynn Atkinson M.D on 10/21/2022. at 2:53 PM.

## 2022-12-19 PROBLEM — J18.9 PNEUMONIA OF LEFT LOWER LOBE DUE TO INFECTIOUS ORGANISM: Status: RESOLVED | Noted: 2022-09-19 | Resolved: 2022-12-19
